# Patient Record
Sex: MALE | Race: BLACK OR AFRICAN AMERICAN | NOT HISPANIC OR LATINO | URBAN - METROPOLITAN AREA
[De-identification: names, ages, dates, MRNs, and addresses within clinical notes are randomized per-mention and may not be internally consistent; named-entity substitution may affect disease eponyms.]

---

## 2017-09-05 ENCOUNTER — OUTPATIENT (OUTPATIENT)
Dept: OUTPATIENT SERVICES | Facility: HOSPITAL | Age: 47
LOS: 1 days | End: 2017-09-05
Payer: MEDICARE

## 2017-09-05 PROCEDURE — 71020: CPT | Mod: 26

## 2017-09-08 ENCOUNTER — OUTPATIENT (OUTPATIENT)
Dept: OUTPATIENT SERVICES | Facility: HOSPITAL | Age: 47
LOS: 1 days | End: 2017-09-08

## 2018-01-16 ENCOUNTER — APPOINTMENT (OUTPATIENT)
Dept: CARDIOLOGY | Facility: CLINIC | Age: 48
End: 2018-01-16

## 2019-01-11 ENCOUNTER — RECORD ABSTRACTING (OUTPATIENT)
Age: 49
End: 2019-01-11

## 2019-01-14 ENCOUNTER — APPOINTMENT (OUTPATIENT)
Dept: CARDIOLOGY | Facility: CLINIC | Age: 49
End: 2019-01-14
Payer: MEDICARE

## 2019-01-14 ENCOUNTER — RECORD ABSTRACTING (OUTPATIENT)
Age: 49
End: 2019-01-14

## 2019-01-14 VITALS
DIASTOLIC BLOOD PRESSURE: 60 MMHG | HEIGHT: 70 IN | BODY MASS INDEX: 41.23 KG/M2 | WEIGHT: 288 LBS | OXYGEN SATURATION: 98 % | HEART RATE: 82 BPM | SYSTOLIC BLOOD PRESSURE: 110 MMHG

## 2019-01-14 DIAGNOSIS — Z86.19 PERSONAL HISTORY OF OTHER INFECTIOUS AND PARASITIC DISEASES: ICD-10-CM

## 2019-01-14 DIAGNOSIS — Z82.49 FAMILY HISTORY OF ISCHEMIC HEART DISEASE AND OTHER DISEASES OF THE CIRCULATORY SYSTEM: ICD-10-CM

## 2019-01-14 DIAGNOSIS — Z86.79 PERSONAL HISTORY OF OTHER DISEASES OF THE CIRCULATORY SYSTEM: ICD-10-CM

## 2019-01-14 DIAGNOSIS — Z87.891 PERSONAL HISTORY OF NICOTINE DEPENDENCE: ICD-10-CM

## 2019-01-14 PROCEDURE — 93282 PRGRMG EVAL IMPLANTABLE DFB: CPT

## 2019-01-14 PROCEDURE — 99204 OFFICE O/P NEW MOD 45 MIN: CPT

## 2019-01-14 RX ORDER — CHROMIUM 200 MCG
1000 TABLET ORAL DAILY
Refills: 0 | Status: ACTIVE | COMMUNITY

## 2019-01-14 RX ORDER — CHLORHEXIDINE GLUCONATE 4 %
250 LIQUID (ML) TOPICAL DAILY
Refills: 0 | Status: ACTIVE | COMMUNITY

## 2019-01-14 NOTE — ASSESSMENT
[FreeTextEntry1] : past tests for reference:\par \par Coronary angiography. 2004. Normal coronary arteries\par \par Echocardiogram 3/17/2015. LV ejection fraction less than 30%. LVIDD 6.5 left atrial size 4.5 cm, trace mitral tricuspid and aortic regurgitation noted. Overall no significant change compared to before.\par \par  EKG ordered and interpreted by me on 1/12/2016 indication is right failure cardiomyopathy hypertension" by me.  Normal sinus rhythm.  Poor RV progression.  Nonspecific ST-T wave changes.\par Labs 1/6/2016 creatinine is 0.81.  Potassium 4.6 sodium 140 CBC is stable. \par \par Echocardiogram reviewed on 4/14/2016.\par Echocardiogram was done on 4/8/2016.  LV ejection fraction 30%.  LVIDD 7.3 cm.  Left atrial size 4.9 cm.  Four-chamber dilatation.  Mild mitral and tricuspid regurgitation.  PAS P 27 mmHg.  Overall no significant change.  LVIDD has been fluctuating in different echocardiograms.  The remaining between 6.6-7.5 cm.\par \par Echocardiogram September 2017, ejection fraction 25%.\par AICD interrogation in October 2018 close to the ER. I\par EKG July 2018 normal sinus rhythm inferior Q waves LVH, nonspecific T wave changes.\par No recent labs available.

## 2019-01-14 NOTE — PHYSICAL EXAM
[General Appearance - Well Developed] : well developed [Normal Appearance] : normal appearance [Well Groomed] : well groomed [General Appearance - Well Nourished] : well nourished [No Deformities] : no deformities [General Appearance - In No Acute Distress] : no acute distress [Normal Conjunctiva] : the conjunctiva exhibited no abnormalities [No Oral Pallor] : no oral pallor [Normal Jugular Venous A Waves Present] : normal jugular venous A waves present [Normal Jugular Venous V Waves Present] : normal jugular venous V waves present [No Jugular Venous Dockery A Waves] : no jugular venous dockery A waves [Not Palpable] : not palpable [No Precordial Heave] : no precordial heave was noted [Normal Rate] : normal [Normal S1] : normal S1 [Normal S2] : normal S2 [S4] : an S4 was heard [I] : a grade 1 [2+] : left 2+ [No Abnormalities] : the abdominal aorta was not enlarged and no bruit was heard [No Pitting Edema] : no pitting edema present [Respiration, Rhythm And Depth] : normal respiratory rhythm and effort [Exaggerated Use Of Accessory Muscles For Inspiration] : no accessory muscle use [Auscultation Breath Sounds / Voice Sounds] : lungs were clear to auscultation bilaterally [Abdomen Soft] : soft [Abdomen Tenderness] : non-tender [Abdomen Mass (___ Cm)] : no abdominal mass palpated [Abnormal Walk] : normal gait [Nail Clubbing] : no clubbing of the fingernails [Cyanosis, Localized] : no localized cyanosis [Petechial Hemorrhages (___cm)] : no petechial hemorrhages [Skin Color & Pigmentation] : normal skin color and pigmentation [] : no rash [No Venous Stasis] : no venous stasis [Skin Lesions] : no skin lesions [No Skin Ulcers] : no skin ulcer [No Xanthoma] : no  xanthoma was observed [Oriented To Time, Place, And Person] : oriented to person, place, and time [Affect] : the affect was normal [Mood] : the mood was normal [No Anxiety] : not feeling anxious [FreeTextEntry1] : obesity [S3] : no S3 [Right Carotid Bruit] : no bruit heard over the right carotid [Left Carotid Bruit] : no bruit heard over the left carotid [Right Femoral Bruit] : no bruit heard over the right femoral artery [Left Femoral Bruit] : no bruit heard over the left femoral artery

## 2019-01-14 NOTE — HISTORY OF PRESENT ILLNESS
[FreeTextEntry1] : \par •History of dilated cardiomyopathy, class II functional status, LV end diastolic dimension 6.6. MUGA test earlier in 2014, 42%; ejection fraction by echocardiogram less than 35%, single lead AICD, last MVO2 16 mL/kg/min. It was done by Dr. Lopez. overall dimensions 6.6-7.5 cm.\par Last echocardiogram, September 2017 LV ejection fraction 25%.\par \par Single lead 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

## 2019-01-14 NOTE — REASON FOR VISIT
[Consultation] : a consultation regarding [Cardiomyopathy] : cardiomyopathy [Heart Failure] : congestive heart failure [Hyperlipidemia] : hyperlipidemia [Hypertension] : hypertension [Ventricular Tachycardia] : ventricular tachycardia [FreeTextEntry1] : 48-year-old is seen in consultation again in the office as he is changing his cardiologist back to a sense of a cardiology/not well cardiology.\par He has no chest pain. No PND, orthopnea, or pedal edema.\par He has no palpitation, dizziness, or syncopal episodes.\par He has not had any AICD discharge.\par He does not do regular exercise. His dietary restrictions are not well. He is gaining weight.\par He has exertional dyspnea. At around 100 yards distance. No associated symptoms. Mild severity. Intermittent in nature. Modified with exertion. Relief at rest.\par He has not had any recent hospital admission from cardiac point of view.\par \par I have reviewed available information from his prior cardiologist.

## 2019-01-14 NOTE — DISCUSSION/SUMMARY
[FreeTextEntry1] : #1 nonischemic dilated cardiomyopathy. His ejection fraction loss check 25% in September 2017 class 2-3, heart failure symptoms without signs of volume overload.\par Obesity noted.\par No regular exercise noted.\par Dietary restrictions are stable.\par No recent AICD discharge.\par \par I reviewed the pathophysiology of dilated cardiomyopathy, again with him.\par \par Counseling regarding low saturated fat, salt and carbohydrate intake was reviewed. Active lifestyle and regular. Exercise along with weight management is advised.\par Weight reduction is reviewed. I have discussed at length. Exercise and activity level to be increased. Salt restriction, along with other doctors restrictions.\par Compliance with medication reviewed.\par high risk symptoms to notify us were discussed.\par \par CBC, CMP, lipid panel ordered.\par Echocardiogram ordered for an ejection fraction dimension\par #2 AICD. A single lead [facility]. we will interoggate it.  He achieved. December 12, 2018. Plan pulse generator change.\par #3 hyperlipidemia. Fasting lipid panel ordered. Continue statin therapy.\par #4 morbid obesity. Weight reduction is strongly recommended.\par #5 sleep apnea. Obstructive. Continue use of CPAP on a regular basis. 6 ventricular and supraventricular weakness. Asymptomatic. Continue present management of cardiomyopathy.\par #6 history of mitral insufficiency. Follow up with echocardiogram in presence of dilated cardiomyopathy.\par \par All the above were at length reviewed. Answered all the questions. Thank you very much for this kind referral. Please do not hesitate to give me a call for any question.\par Part of this transcription was done with voice recognition software and phonetically similar errors are common. I apologize for that. Please donot hesitate to call for any questions due to above.\par \par Followup after post generator change.\par

## 2019-01-14 NOTE — REASON FOR VISIT
[New Patient Device Check] : new patient device check visit [HEATHER (Elective Replacement Indication)] : elective replacement indication

## 2019-01-14 NOTE — REVIEW OF SYSTEMS
[Shortness Of Breath] : shortness of breath [Dyspnea on exertion] : dyspnea during exertion [Chest  Pressure] : no chest pressure [Chest Pain] : no chest pain [Lower Ext Edema] : no extremity edema [Leg Claudication] : no intermittent leg claudication [Palpitations] : no palpitations [Negative] : Psychiatric

## 2019-01-14 NOTE — PROCEDURE
[No] : not [See Device Printout] : See device printout [ICD] : Implantable cardioverter-defibrillator [VVI] : VVI [Lead Imp:  ___ohms] : lead impedance was [unfilled] ohms [Sensing Amplitude ___mv] : sensing amplitude was [unfilled] mv [___V @] : [unfilled] V [___ ms] : [unfilled] ms [None] : none [de-identified] : [facility]  [de-identified] : current [facility] VR [id number] [de-identified] : [id number] [de-identified] : 8/10/10 [de-identified] : 38 [de-identified] : HEATHER 12/12/18 [de-identified] : Alert was noted patient device at [facility]ATHER on December 12, 2018. No significant arrhythmia noted. No device therapy.

## 2019-01-14 NOTE — REVIEW OF SYSTEMS
[see HPI] : see HPI [Shortness Of Breath] : shortness of breath [Dyspnea on exertion] : dyspnea during exertion [Negative] : Heme/Lymph [Chest  Pressure] : no chest pressure [Chest Pain] : no chest pain [Lower Ext Edema] : no extremity edema [Leg Claudication] : no intermittent leg claudication [Palpitations] : no palpitations

## 2019-01-21 ENCOUNTER — APPOINTMENT (OUTPATIENT)
Dept: CARDIOLOGY | Facility: CLINIC | Age: 49
End: 2019-01-21
Payer: MEDICARE

## 2019-01-21 PROCEDURE — 93306 TTE W/DOPPLER COMPLETE: CPT

## 2019-01-24 ENCOUNTER — APPOINTMENT (OUTPATIENT)
Age: 49
End: 2019-01-24
Payer: MEDICARE

## 2019-01-24 VITALS
SYSTOLIC BLOOD PRESSURE: 110 MMHG | DIASTOLIC BLOOD PRESSURE: 64 MMHG | WEIGHT: 285 LBS | HEIGHT: 70 IN | OXYGEN SATURATION: 97 % | BODY MASS INDEX: 40.8 KG/M2 | HEART RATE: 81 BPM

## 2019-01-24 PROCEDURE — 99214 OFFICE O/P EST MOD 30 MIN: CPT

## 2019-01-24 RX ORDER — ASPIRIN 81 MG
81 TABLET, DELAYED RELEASE (ENTERIC COATED) ORAL DAILY
Refills: 0 | Status: ACTIVE | COMMUNITY

## 2019-01-24 NOTE — PHYSICAL EXAM
[General Appearance - Well Developed] : well developed [Normal Appearance] : normal appearance [Well Groomed] : well groomed [General Appearance - Well Nourished] : well nourished [No Deformities] : no deformities [General Appearance - In No Acute Distress] : no acute distress [Normal Conjunctiva] : the conjunctiva exhibited no abnormalities [No Oral Pallor] : no oral pallor [Normal Jugular Venous A Waves Present] : normal jugular venous A waves present [Normal Jugular Venous V Waves Present] : normal jugular venous V waves present [No Jugular Venous Dockery A Waves] : no jugular venous dockery A waves [Respiration, Rhythm And Depth] : normal respiratory rhythm and effort [Exaggerated Use Of Accessory Muscles For Inspiration] : no accessory muscle use [Auscultation Breath Sounds / Voice Sounds] : lungs were clear to auscultation bilaterally [Abdomen Soft] : soft [Abdomen Tenderness] : non-tender [Abdomen Mass (___ Cm)] : no abdominal mass palpated [Abnormal Walk] : normal gait [Nail Clubbing] : no clubbing of the fingernails [Cyanosis, Localized] : no localized cyanosis [Petechial Hemorrhages (___cm)] : no petechial hemorrhages [Skin Color & Pigmentation] : normal skin color and pigmentation [] : no rash [No Venous Stasis] : no venous stasis [Skin Lesions] : no skin lesions [No Skin Ulcers] : no skin ulcer [No Xanthoma] : no  xanthoma was observed [Oriented To Time, Place, And Person] : oriented to person, place, and time [Affect] : the affect was normal [Mood] : the mood was normal [No Anxiety] : not feeling anxious [Not Palpable] : not palpable [No Precordial Heave] : no precordial heave was noted [Normal Rate] : normal [Normal S1] : normal S1 [Normal S2] : normal S2 [S4] : an S4 was heard [I] : a grade 1 [2+] : left 2+ [No Abnormalities] : the abdominal aorta was not enlarged and no bruit was heard [No Pitting Edema] : no pitting edema present [FreeTextEntry1] : obesity [S3] : no S3 [Right Carotid Bruit] : no bruit heard over the right carotid [Left Carotid Bruit] : no bruit heard over the left carotid [Right Femoral Bruit] : no bruit heard over the right femoral artery [Left Femoral Bruit] : no bruit heard over the left femoral artery

## 2019-01-24 NOTE — DISCUSSION/SUMMARY
[FreeTextEntry1] : Jonel is a 49-year-old male with medical history detailed above and active medical issues including:\par \par - ICD at [facility] 12/18/19 scheduled for ICD generator change Dr Valentino 2/11/19, [facility] [place]par Patient will be seen for a one-week postop wound check.  Patient will return to the cardiology care of Dr Sita Macias. Patient is optimized from a cardiovascular perspective and may proceed with surgery.  Patient will continue current medications including as to the time of surgery. Please call with any further questions. \par \par - Dilated cardiomyopathy LVEF 30-35% echo January 2019\par \par - HFrEF, well compensated volume status is optimized\par \par - History of NSVT, PSVT, sinus tachycardia, heart rate remains well controlled on Coreg\par \par - Obstructive sleep apnea\par \par - Morbid obesity\par \par - Dyslipidemia\par \par Advised patient to follow active lifestyle with regular cardiovascular exercise. Patient educated on lifestyle and diet modification with low sodium low fat diet and avoidance of excessive alcohol. Patient is aware to call with any symptoms or concerns. \par \par Jonel will followup with Dr Tavon Orona for primary care\par

## 2019-01-24 NOTE — PROCEDURE
[No] : not [See Device Printout] : See device printout [ICD] : Implantable cardioverter-defibrillator [VVI] : VVI [Lead Imp:  ___ohms] : lead impedance was [unfilled] ohms [Sensing Amplitude ___mv] : sensing amplitude was [unfilled] mv [___V @] : [unfilled] V [___ ms] : [unfilled] ms [None] : none [de-identified] : [facility]  [de-identified] : current [facility] VR [id number] [de-identified] : [id number] [de-identified] : 8/10/10 [de-identified] : 38 [de-identified] : HEATHER 12/12/18 [de-identified] : Alert was noted patient device at [facility]ER on December 12, 2018. No significant arrhythmia noted. No device therapy.

## 2019-01-28 ENCOUNTER — OUTPATIENT (OUTPATIENT)
Dept: OUTPATIENT SERVICES | Facility: HOSPITAL | Age: 49
LOS: 1 days | End: 2019-01-28
Payer: MEDICARE

## 2019-01-28 PROCEDURE — 71046 X-RAY EXAM CHEST 2 VIEWS: CPT | Mod: 26

## 2019-02-07 ENCOUNTER — RX RENEWAL (OUTPATIENT)
Age: 49
End: 2019-02-07

## 2019-02-08 ENCOUNTER — RX RENEWAL (OUTPATIENT)
Age: 49
End: 2019-02-08

## 2019-02-11 ENCOUNTER — OUTPATIENT (OUTPATIENT)
Dept: OUTPATIENT SERVICES | Facility: HOSPITAL | Age: 49
LOS: 1 days | End: 2019-02-11
Payer: MEDICARE

## 2019-02-11 PROCEDURE — 33262 RMVL& REPLC PULSE GEN 1 LEAD: CPT

## 2019-02-19 ENCOUNTER — APPOINTMENT (OUTPATIENT)
Dept: CARDIOLOGY | Facility: CLINIC | Age: 49
End: 2019-02-19
Payer: MEDICARE

## 2019-02-19 VITALS
SYSTOLIC BLOOD PRESSURE: 110 MMHG | WEIGHT: 292 LBS | DIASTOLIC BLOOD PRESSURE: 64 MMHG | HEIGHT: 70 IN | HEART RATE: 82 BPM | BODY MASS INDEX: 41.8 KG/M2

## 2019-02-19 PROCEDURE — 99024 POSTOP FOLLOW-UP VISIT: CPT

## 2019-02-19 PROCEDURE — 93282 PRGRMG EVAL IMPLANTABLE DFB: CPT

## 2019-02-19 RX ORDER — CEFUROXIME AXETIL 500 MG/1
500 TABLET ORAL
Qty: 10 | Refills: 0 | Status: DISCONTINUED | COMMUNITY
Start: 2019-02-07 | End: 2019-02-19

## 2019-02-19 NOTE — PHYSICAL EXAM
[General Appearance - Well Developed] : well developed [Normal Appearance] : normal appearance [Well Groomed] : well groomed [General Appearance - Well Nourished] : well nourished [No Deformities] : no deformities [General Appearance - In No Acute Distress] : no acute distress [Normal Conjunctiva] : the conjunctiva exhibited no abnormalities [No Oral Pallor] : no oral pallor [Normal Jugular Venous A Waves Present] : normal jugular venous A waves present [Normal Jugular Venous V Waves Present] : normal jugular venous V waves present [No Jugular Venous Dockery A Waves] : no jugular venous dockery A waves [Respiration, Rhythm And Depth] : normal respiratory rhythm and effort [Exaggerated Use Of Accessory Muscles For Inspiration] : no accessory muscle use [Auscultation Breath Sounds / Voice Sounds] : lungs were clear to auscultation bilaterally [Abdomen Soft] : soft [Abdomen Tenderness] : non-tender [Abdomen Mass (___ Cm)] : no abdominal mass palpated [Abnormal Walk] : normal gait [Nail Clubbing] : no clubbing of the fingernails [Cyanosis, Localized] : no localized cyanosis [Petechial Hemorrhages (___cm)] : no petechial hemorrhages [Skin Color & Pigmentation] : normal skin color and pigmentation [] : no rash [No Venous Stasis] : no venous stasis [Skin Lesions] : no skin lesions [No Skin Ulcers] : no skin ulcer [No Xanthoma] : no  xanthoma was observed [Oriented To Time, Place, And Person] : oriented to person, place, and time [Affect] : the affect was normal [Mood] : the mood was normal [No Anxiety] : not feeling anxious [Not Palpable] : not palpable [No Precordial Heave] : no precordial heave was noted [Normal Rate] : normal [Normal S1] : normal S1 [Normal S2] : normal S2 [S4] : an S4 was heard [I] : a grade 1 [2+] : left 2+ [No Abnormalities] : the abdominal aorta was not enlarged and no bruit was heard [No Pitting Edema] : no pitting edema present [Heart Rate And Rhythm] : heart rate and rhythm were normal [Heart Sounds] : normal S1 and S2 [Murmurs] : no murmurs present [FreeTextEntry1] : ICD wound is clean and dry with granulation seen [S3] : no S3 [Right Carotid Bruit] : no bruit heard over the right carotid [Left Carotid Bruit] : no bruit heard over the left carotid [Right Femoral Bruit] : no bruit heard over the right femoral artery [Left Femoral Bruit] : no bruit heard over the left femoral artery

## 2019-02-19 NOTE — PROCEDURE
[No] : not [See Device Printout] : See device printout [ICD] : Implantable cardioverter-defibrillator [VVI] : VVI [Lead Imp:  ___ohms] : lead impedance was [unfilled] ohms [Sensing Amplitude ___mv] : sensing amplitude was [unfilled] mv [___V @] : [unfilled] V [___ ms] : [unfilled] ms [None] : none [de-identified] : [facility]  [de-identified] : current [facility] VR [id number] [de-identified] : [id number] [de-identified] : 8/10/10 [de-identified] : 38 [de-identified] : HEATHER 12/12/18 [de-identified] : Alert was noted patient device at [facility]ER on December 12, 2018. No significant arrhythmia noted. No device therapy.

## 2019-02-19 NOTE — REASON FOR VISIT
[Follow-Up - Clinic] : a clinic follow-up of [New Patient Device Check] : new patient device check visit [HEATHER (Elective Replacement Indication)] : elective replacement indication [FreeTextEntry2] : wound check postop single chamber MDT ICD generator change 2/11/19 [facility], CMP LVEF 30-35% echo Jan 2019 [FreeTextEntry1] : ICD incision is clean and dry, wound edges are well approximated with granulation seen.  It appears patient has early signs of keloid formation.  Bactoban ointment will be applied sparingly twice per day without dressing.  Patient will continue to shower daily then keep wound clean and dry.  No bath or swimming until second wound check in one week. \par \par Device check today reveals normally functioning device with unchanged settings. \par \par Jonel is a 48-year-old male with history of dilated cardiomyopathy LVEF 30-35% echo in January 2019, NSVT, PSVT and sinus tach, obstructive sleep apnea, morbid obesity, dyslipidemia,HFrEF, [facility] single-chamber ICD 2010, V-pacing less than 1%.  ICD at [facility]ER 12/18/19 scheduled for ICD generator change Dr Valentino 2/11/19, [facility] [place].\par \par Patient has dyspnea with moderate exertion. Cardiovascular review of symptoms is negative for exertional chest pain,  palpitations, dizziness or syncope.  No PND or orthopnea leg edema.  No bleeding or black stool.  No ICD shocks. \par \par Patient is walking 15 minutes without exertional chest pain. \par \par \par \par \par

## 2019-02-19 NOTE — PROCEDURE
[No] : not [NSR] : normal sinus rhythm [ICD] : Implantable cardioverter-defibrillator [VVI] : VVI [Voltage: ___ volts] : Voltage was [unfilled] volts [Threshold Testing Performed] : Threshold testing was performed [Lead Imp:  ___ohms] : lead impedance was [unfilled] ohms [Sensing Amplitude ___mv] : sensing amplitude was [unfilled] mv [___V @] : [unfilled] V [___ ms] : [unfilled] ms [None] : none [de-identified] : [employer] [de-identified] : Yvon JOSE VR [id number] [de-identified] : [id number] [de-identified] : 2/11/19 [de-identified] : 38 [de-identified] : 10.9 years [de-identified] : Apaced 0%\par Vpaced <0.1%\par \par Shock Therapy\par VF at 200bpm: ATP during charge, 35J x6\par FVT via VF at 231bpm: burst (1), 35J x5\par VT: Off\par \par No new recorded events.\par \par Next interrogation in 3 months. \par \par Sincerely,\par \par CALDERON Johnson\par Reviewed with supervising MD: Dr. Patrick Valentino

## 2019-02-19 NOTE — DISCUSSION/SUMMARY
[FreeTextEntry1] : Jonel is a 49-year-old male with medical history detailed above and active medical issues including:\par \par - Wound check for ICD generator [place] [facility] [place] incision is clean and dry, wound edges are well approximated with granulation seen.  It appears patient has early signs of keloid formation.  Bactoban ointment will be applied sparingly twice per day without dressing.  Patient will continue to shower daily then keep wound clean and dry.  No bath or swimming until second wound check in one week. \par \par - Dilated cardiomyopathy LVEF 30-35% echo January 2019\par \par - HFrEF, well compensated volume status is optimized\par \par - History of NSVT, PSVT, sinus tachycardia, heart rate remains well controlled on Coreg\par \par - Obstructive sleep apnea\par \par - Morbid obesity\par \par - Dyslipidemia\par \par Advised patient to follow active lifestyle with regular cardiovascular exercise. Patient educated on lifestyle and diet modification with low sodium low fat diet and avoidance of excessive alcohol. Patient is aware to call with any symptoms or concerns. \par \par Patient will followup with cardiologist Dr Sita Macias. \par \par Jonel will followup with Dr Tavon Orona for primary care\par

## 2019-02-22 ENCOUNTER — RECORD ABSTRACTING (OUTPATIENT)
Age: 49
End: 2019-02-22

## 2019-02-27 ENCOUNTER — APPOINTMENT (OUTPATIENT)
Dept: CARDIOLOGY | Facility: CLINIC | Age: 49
End: 2019-02-27
Payer: MEDICARE

## 2019-02-27 VITALS
BODY MASS INDEX: 41.52 KG/M2 | HEIGHT: 70 IN | WEIGHT: 290 LBS | OXYGEN SATURATION: 99 % | HEART RATE: 90 BPM | DIASTOLIC BLOOD PRESSURE: 64 MMHG | SYSTOLIC BLOOD PRESSURE: 118 MMHG

## 2019-02-27 PROCEDURE — 99024 POSTOP FOLLOW-UP VISIT: CPT

## 2019-02-27 NOTE — DISCUSSION/SUMMARY
[FreeTextEntry1] : Jonel is a 49-year-old male with medical history detailed above and active medical issues including:\par \par - Second wound check for ICD generator [place]19[place] [facility] [place] incision is clean and dry, wound edges are well approximated with granulation seen.  It appears patient has early signs of keloid formation.  Bactoban ointment will be applied sparingly twice per day with Tegoderm dressing daytime.  Patient will continue to shower daily then keep wound clean and dry.  No bath or swimming until 3rd wound check in one week. If wound has poor healing will refer patient to plastic surgery Dr Slaughter.\par \par - Dilated cardiomyopathy LVEF 30-35% echo January 2019\par \par - HFrEF, well compensated volume status is optimized\par \par - History of NSVT, PSVT, sinus tachycardia, heart rate remains well controlled on Coreg\par \par - Obstructive sleep apnea\par \par - Morbid obesity\par \par - Dyslipidemia\par \par Advised patient to follow active lifestyle with regular cardiovascular exercise. Patient educated on lifestyle and diet modification with low sodium low fat diet and avoidance of excessive alcohol. Patient is aware to call with any symptoms or concerns. \par \par Patient will followup with cardiologist Dr Sita Macias. \par \par Jonel will followup with Dr Tavon Orona for primary care\par

## 2019-02-27 NOTE — REASON FOR VISIT
[Follow-Up - Clinic] : a clinic follow-up of [New Patient Device Check] : new patient device check visit [HEATHER (Elective Replacement Indication)] : elective replacement indication [FreeTextEntry2] : second wound check postop single chamber MDT ICD generator change 2/11/19 [facility], CMP LVEF 30-35% echo Jan 2019 [FreeTextEntry1] : ICD incision is clean and dry, wound edges are well approximated with granulation seen.  It appears patient has early signs of keloid formation.  Bactoban ointment will be applied sparingly twice per day with Tegoderm dressing daytime one additional week.  Patient will continue to shower daily then keep wound clean and dry.  No bath or swimming until second wound check in one week. \par \par Device check 2/19/19 reveals normally functioning device with unchanged settings. \par \par Jonel is a 48-year-old male with history of dilated cardiomyopathy LVEF 30-35% echo in January 2019, NSVT, PSVT and sinus tach, obstructive sleep apnea, morbid obesity, dyslipidemia,HFrEF, [facility] single-chamber ICD 2010, V-pacing less than 1%.\par \par Patient has dyspnea with moderate exertion. Cardiovascular review of symptoms is negative for exertional chest pain,  palpitations, dizziness or syncope.  No PND or orthopnea leg edema.  No bleeding or black stool.  No ICD shocks. \par \par Patient is walking 15 minutes without exertional chest pain. \par \par \par \par \par

## 2019-02-27 NOTE — PROCEDURE
[No] : not [See Device Printout] : See device printout [ICD] : Implantable cardioverter-defibrillator [VVI] : VVI [Lead Imp:  ___ohms] : lead impedance was [unfilled] ohms [Sensing Amplitude ___mv] : sensing amplitude was [unfilled] mv [___V @] : [unfilled] V [___ ms] : [unfilled] ms [None] : none [de-identified] : [facility]  [de-identified] : current [facility] VR [id number] [de-identified] : [id number] [de-identified] : 8/10/10 [de-identified] : 38 [de-identified] : HEATHER 12/12/18 [de-identified] : Alert was noted patient device at HEATHER on December 12, 2018. No significant arrhythmia noted. No device therapy.

## 2019-02-27 NOTE — PHYSICAL EXAM
[General Appearance - Well Developed] : well developed [Normal Appearance] : normal appearance [Well Groomed] : well groomed [General Appearance - Well Nourished] : well nourished [No Deformities] : no deformities [General Appearance - In No Acute Distress] : no acute distress [Normal Conjunctiva] : the conjunctiva exhibited no abnormalities [No Oral Pallor] : no oral pallor [Normal Jugular Venous A Waves Present] : normal jugular venous A waves present [Normal Jugular Venous V Waves Present] : normal jugular venous V waves present [No Jugular Venous Dockery A Waves] : no jugular venous dockery A waves [Respiration, Rhythm And Depth] : normal respiratory rhythm and effort [Exaggerated Use Of Accessory Muscles For Inspiration] : no accessory muscle use [Auscultation Breath Sounds / Voice Sounds] : lungs were clear to auscultation bilaterally [Heart Rate And Rhythm] : heart rate and rhythm were normal [Heart Sounds] : normal S1 and S2 [Murmurs] : no murmurs present [Abdomen Soft] : soft [Abdomen Tenderness] : non-tender [Abdomen Mass (___ Cm)] : no abdominal mass palpated [Abnormal Walk] : normal gait [Nail Clubbing] : no clubbing of the fingernails [Cyanosis, Localized] : no localized cyanosis [Petechial Hemorrhages (___cm)] : no petechial hemorrhages [Skin Color & Pigmentation] : normal skin color and pigmentation [] : no rash [No Venous Stasis] : no venous stasis [Skin Lesions] : no skin lesions [No Skin Ulcers] : no skin ulcer [No Xanthoma] : no  xanthoma was observed [Oriented To Time, Place, And Person] : oriented to person, place, and time [Affect] : the affect was normal [Mood] : the mood was normal [No Anxiety] : not feeling anxious [Not Palpable] : not palpable [No Precordial Heave] : no precordial heave was noted [Normal Rate] : normal [Normal S1] : normal S1 [Normal S2] : normal S2 [S4] : an S4 was heard [I] : a grade 1 [2+] : left 2+ [No Abnormalities] : the abdominal aorta was not enlarged and no bruit was heard [No Pitting Edema] : no pitting edema present [FreeTextEntry1] : ICD wound is clean and dry with granulation seen [S3] : no S3 [Right Carotid Bruit] : no bruit heard over the right carotid [Left Carotid Bruit] : no bruit heard over the left carotid [Right Femoral Bruit] : no bruit heard over the right femoral artery [Left Femoral Bruit] : no bruit heard over the left femoral artery

## 2019-03-05 ENCOUNTER — APPOINTMENT (OUTPATIENT)
Dept: CARDIOLOGY | Facility: CLINIC | Age: 49
End: 2019-03-05

## 2019-03-07 ENCOUNTER — TRANSCRIPTION ENCOUNTER (OUTPATIENT)
Age: 49
End: 2019-03-07

## 2019-03-07 ENCOUNTER — APPOINTMENT (OUTPATIENT)
Dept: CARDIOLOGY | Facility: CLINIC | Age: 49
End: 2019-03-07
Payer: MEDICARE

## 2019-03-07 VITALS
BODY MASS INDEX: 41.23 KG/M2 | HEIGHT: 70 IN | SYSTOLIC BLOOD PRESSURE: 100 MMHG | DIASTOLIC BLOOD PRESSURE: 60 MMHG | WEIGHT: 288 LBS | HEART RATE: 68 BPM | OXYGEN SATURATION: 97 %

## 2019-03-07 PROCEDURE — 99024 POSTOP FOLLOW-UP VISIT: CPT

## 2019-03-07 NOTE — DISCUSSION/SUMMARY
[FreeTextEntry1] : Jonel is a 49-year-old male with medical history detailed above and active medical issues including:\par \par - ICD incision is clean and dry, wound edges are well approximated with granulation seen.  It appears patient has early signs of keloid formation.  Bactoban ointment will be applied sparingly twice per day with Tegoderm dressing daytime one additional week.  Patient will continue to shower daily then keep wound clean and dry.  No bath or swimming until second wound check in one week. Patient will followup with plastic surgery Dr Jean Slaughter for continued wound care management. \par \par - Dilated cardiomyopathy LVEF 30-35% echo January 2019\par \par - HFrEF, well compensated volume status is optimized\par \par - History of NSVT, PSVT, sinus tachycardia, heart rate remains well controlled on Coreg\par \par - Obstructive sleep apnea\par \par - Morbid obesity\par \par - Dyslipidemia\par \par Advised patient to follow active lifestyle with regular cardiovascular exercise. Patient educated on lifestyle and diet modification with low sodium low fat diet and avoidance of excessive alcohol. Patient is aware to call with any symptoms or concerns. \par \par Patient will followup with cardiologist Dr Sita Macias. \par \par Jonel will followup with Dr Tavon Orona for primary care\par

## 2019-03-07 NOTE — PROCEDURE
[No] : not [See Device Printout] : See device printout [ICD] : Implantable cardioverter-defibrillator [VVI] : VVI [Lead Imp:  ___ohms] : lead impedance was [unfilled] ohms [Sensing Amplitude ___mv] : sensing amplitude was [unfilled] mv [___V @] : [unfilled] V [___ ms] : [unfilled] ms [None] : none [de-identified] : [facility]  [de-identified] : current [facility] VR [id number] [de-identified] : [id number] [de-identified] : 8/10/10 [de-identified] : 38 [de-identified] : HEATHER 12/12/18 [de-identified] : Alert was noted patient device at HEATHER on December 12, 2018. No significant arrhythmia noted. No device therapy.

## 2019-03-07 NOTE — REASON FOR VISIT
[Follow-Up - Clinic] : a clinic follow-up of [New Patient Device Check] : new patient device check visit [HEATHER (Elective Replacement Indication)] : elective replacement indication [FreeTextEntry2] : third wound check postop single chamber MDT ICD generator change 2/11/19 [facility], CMP LVEF 30-35% echo Jan 2019 [FreeTextEntry1] : ICD incision is clean and dry, wound edges are well approximated with granulation seen.  It appears patient has early signs of keloid formation.  Bactoban ointment will be applied sparingly twice per day with Tegoderm dressing daytime one additional week.  Patient will continue to shower daily then keep wound clean and dry.  No bath or swimming until second wound check in one week. Patient will followup with plastic surgery Dr Jean Slaughter for continued wound care management. \par \par Device check 2/19/19 reveals normally functioning device with unchanged settings. \par \par Jonel is a 48-year-old male with history of dilated cardiomyopathy LVEF 30-35% echo in January 2019, NSVT, PSVT and sinus tach, obstructive sleep apnea, morbid obesity, dyslipidemia,HFrEF, [facility] single-chamber ICD 2010, V-pacing less than 1%.\par \par Patient has dyspnea with moderate exertion. Cardiovascular review of symptoms is negative for exertional chest pain,  palpitations, dizziness or syncope.  No PND or orthopnea leg edema.  No bleeding or black stool.  No ICD shocks. \par \par Patient is walking 15 minutes without exertional chest pain. \par \par \par \par \par

## 2019-04-15 ENCOUNTER — APPOINTMENT (OUTPATIENT)
Dept: CARDIOLOGY | Facility: CLINIC | Age: 49
End: 2019-04-15
Payer: MEDICARE

## 2019-04-15 ENCOUNTER — RECORD ABSTRACTING (OUTPATIENT)
Age: 49
End: 2019-04-15

## 2019-04-15 VITALS
BODY MASS INDEX: 41.23 KG/M2 | WEIGHT: 288 LBS | SYSTOLIC BLOOD PRESSURE: 100 MMHG | HEIGHT: 70 IN | DIASTOLIC BLOOD PRESSURE: 60 MMHG | HEART RATE: 71 BPM | OXYGEN SATURATION: 98 %

## 2019-04-15 DIAGNOSIS — Z09 ENCOUNTER FOR FOLLOW-UP EXAMINATION AFTER COMPLETED TREATMENT FOR CONDITIONS OTHER THAN MALIGNANT NEOPLASM: ICD-10-CM

## 2019-04-15 DIAGNOSIS — Z51.89 ENCOUNTER FOR OTHER SPECIFIED AFTERCARE: ICD-10-CM

## 2019-04-15 DIAGNOSIS — Z29.9 ENCOUNTER FOR PROPHYLACTIC MEASURES, UNSPECIFIED: ICD-10-CM

## 2019-04-15 PROCEDURE — 99214 OFFICE O/P EST MOD 30 MIN: CPT | Mod: 25

## 2019-04-15 PROCEDURE — 93282 PRGRMG EVAL IMPLANTABLE DFB: CPT

## 2019-04-15 NOTE — REVIEW OF SYSTEMS
[Shortness Of Breath] : shortness of breath [Dyspnea on exertion] : dyspnea during exertion [see HPI] : see HPI [Negative] : Endocrine [Chest  Pressure] : no chest pressure [Chest Pain] : no chest pain [Lower Ext Edema] : no extremity edema [Palpitations] : no palpitations [Leg Claudication] : no intermittent leg claudication

## 2019-04-15 NOTE — ASSESSMENT
[FreeTextEntry1] : past tests for reference:\par \par Coronary angiography. 2004. Normal coronary arteries\par \par Echocardiogram 3/17/2015. LV ejection fraction less than 30%. LVIDD 6.5 left atrial size 4.5 cm, trace mitral tricuspid and aortic regurgitation noted. Overall no significant change compared to before.\par \par  EKG ordered and interpreted by me on 1/12/2016 indication is right failure cardiomyopathy hypertension" by me.  Normal sinus rhythm.  Poor RV progression.  Nonspecific ST-T wave changes.\par Labs 1/6/2016 creatinine is 0.81.  Potassium 4.6 sodium 140 CBC is stable. \par \par Echocardiogram reviewed on 4/14/2016.\par Echocardiogram was done on 4/8/2016.  LV ejection fraction 30%.  LVIDD 7.3 cm.  Left atrial size 4.9 cm.  Four-chamber dilatation.  Mild mitral and tricuspid regurgitation.  PAS P 27 mmHg.  Overall no significant change.  LVIDD has been fluctuating in different echocardiograms.  The remaining between 6.6-7.5 cm.\par \par Echocardiogram September 2017, ejection fraction 25%.\par AICD interrogation in October 2018 close to the ER. I\par EKG July 2018 normal sinus rhythm inferior Q waves LVH, nonspecific T wave changes.\par No recent labs available.\par \par Reviewed on April 15, 2019\par Echocardiogram January 2019 and a ejection fraction 30-35% . LVIDD 6.0. Mild mitral regurgitation. Moderate and judgment. Concentric LVH. Normal pulmonary pressures.\par Dr. Valentino information reviewed in addition to ICD revision

## 2019-04-15 NOTE — PHYSICAL EXAM
[Normal Appearance] : normal appearance [General Appearance - Well Developed] : well developed [No Deformities] : no deformities [General Appearance - Well Nourished] : well nourished [Well Groomed] : well groomed [Normal Conjunctiva] : the conjunctiva exhibited no abnormalities [General Appearance - In No Acute Distress] : no acute distress [No Oral Pallor] : no oral pallor [Normal Jugular Venous A Waves Present] : normal jugular venous A waves present [Normal Jugular Venous V Waves Present] : normal jugular venous V waves present [No Jugular Venous Dockery A Waves] : no jugular venous dockery A waves [Respiration, Rhythm And Depth] : normal respiratory rhythm and effort [Exaggerated Use Of Accessory Muscles For Inspiration] : no accessory muscle use [Auscultation Breath Sounds / Voice Sounds] : lungs were clear to auscultation bilaterally [Arterial Pulses Normal] : the arterial pulses were normal [Heart Sounds] : normal S1 and S2 [Heart Rate And Rhythm] : heart rate and rhythm were normal [Veins - Varicosity Changes] : no varicosital changes were noted in the lower extremities [Edema] : no peripheral edema present [Abdomen Soft] : soft [Abnormal Walk] : normal gait [Nail Clubbing] : no clubbing of the fingernails [Cyanosis, Localized] : no localized cyanosis [] : no rash [Skin Color & Pigmentation] : normal skin color and pigmentation [Oriented To Time, Place, And Person] : oriented to person, place, and time [Mood] : the mood was normal [No Anxiety] : not feeling anxious [Affect] : the affect was normal [FreeTextEntry1] : PMi could not be plapatedLUSB 1/6 at the base, gallop. no rub, heave or click.

## 2019-04-15 NOTE — DISCUSSION/SUMMARY
[FreeTextEntry1] : #1 nonischemic dilated cardiomyopathy. His ejection fraction last check 30-35% in September 2017 class 2, heart failure symptoms without signs of volume overload.\par Obesity noted.\par No regular exercise noted.\par Dietary restrictions are stable.\par No recent AICD discharge.\par \par I reviewed the pathophysiology of dilated cardiomyopathy, again with him.\par \par Counseling regarding low saturated fat, salt and carbohydrate intake was reviewed. Active lifestyle and regular. Exercise along with weight management is advised.\par Weight reduction is reviewed. I have discussed at length. Exercise and activity level to be increased. Salt restriction, along with other doctors restrictions.\par Compliance with medication reviewed.\par high risk symptoms to notify us were discussed.\par CBC, CMP, lipid panel ordered. he is to do it today\par #2 AICD. A single lead MDT. Interrogated today. Continue to follow\par #3 hyperlipidemia. Fasting lipid panel ordered. Continue statin therapy.\par #4 morbid obesity. Weight reduction is strongly recommended.\par #5 sleep apnea. Obstructive. Continue use of CPAP on a regular basis. 6 ventricular and supraventricular weakness. Asymptomatic. Continue present management of cardiomyopathy.\par #6 history of mitral insufficiency. Follow up with echocardiogram in presence of dilated cardiomyopathy.\par \par All the above were at length reviewed. Answered all the questions. Thank you very much for this kind referral. Please do not hesitate to give me a call for any question.\par Part of this transcription was done with voice recognition software and phonetically similar errors are common. I apologize for that. Please donot hesitate to call for any questions due to above.\par \par Followup  3 months with ICD check\par

## 2019-04-15 NOTE — PROCEDURE
[No] : not [NSR] : normal sinus rhythm [ICD] : Implantable cardioverter-defibrillator [VVI] : VVI [Voltage: ___ volts] : Voltage was [unfilled] volts [Threshold Testing Performed] : Threshold testing was performed [Lead Imp:  ___ohms] : lead impedance was [unfilled] ohms [Sensing Amplitude ___mv] : sensing amplitude was [unfilled] mv [___V @] : [unfilled] V [___ ms] : [unfilled] ms [None] : none [de-identified] : Yvon JOSE VR [id number] [de-identified] : [employer] [de-identified] : 40 [de-identified] : [id number] [de-identified] : 2/11/19 [de-identified] : 10.8 years [de-identified] : Apaced 0%\par Vpaced <0.1%\par \par Shock Therapy\par VF at 200bpm: ATP during charge, 35J x6\par FVT via VF at 231bpm: burst (1), 35J x5\par VT: Off\par \par No new recorded events.\par \par OV with Dr. Macias today.\par \par Next interrogation in 3 months. \par \par Sincerely,\par \par CALDERON Johnson\par Reviewed with supervising MD: Dr. Sita Macias

## 2019-04-15 NOTE — REASON FOR VISIT
[Follow-Up - Clinic] : a clinic follow-up of [Heart Failure] : congestive heart failure [Cardiomyopathy] : cardiomyopathy [Hypertension] : hypertension [Hyperlipidemia] : hyperlipidemia [Ventricular Tachycardia] : ventricular tachycardia [FreeTextEntry1] : 48-year-old male comes in for followup consultation. Recent revision of his AICD.\par Did not get his blood test done\par He has no chest pain. No PND, orthopnea, or pedal edema.\par He has no palpitation, dizziness, or syncopal episodes.\par He has not had any AICD discharge.\par He does not do regular exercise. His dietary restrictions are not well. He is gaining weight.\par He has exertional dyspnea. At around 100 yards distance. No associated symptoms. Mild severity. Intermittent in nature. Modified with exertion. Relief at rest.\par He has not had any recent hospital admission from cardiac point of view.\par \par I have reviewed available information from his prior cardiologist.

## 2019-04-16 ENCOUNTER — APPOINTMENT (OUTPATIENT)
Dept: CARDIOLOGY | Facility: CLINIC | Age: 49
End: 2019-04-16

## 2019-04-19 ENCOUNTER — MEDICATION RENEWAL (OUTPATIENT)
Age: 49
End: 2019-04-19

## 2019-07-18 ENCOUNTER — APPOINTMENT (OUTPATIENT)
Dept: CARDIOLOGY | Facility: CLINIC | Age: 49
End: 2019-07-18
Payer: MEDICARE

## 2019-07-18 ENCOUNTER — RESULT CHARGE (OUTPATIENT)
Age: 49
End: 2019-07-18

## 2019-07-18 VITALS
HEIGHT: 70 IN | OXYGEN SATURATION: 98 % | HEART RATE: 74 BPM | SYSTOLIC BLOOD PRESSURE: 100 MMHG | BODY MASS INDEX: 40.66 KG/M2 | WEIGHT: 284 LBS | DIASTOLIC BLOOD PRESSURE: 60 MMHG

## 2019-07-18 PROCEDURE — 93282 PRGRMG EVAL IMPLANTABLE DFB: CPT

## 2019-07-18 PROCEDURE — 99214 OFFICE O/P EST MOD 30 MIN: CPT | Mod: 25

## 2019-07-18 NOTE — DISCUSSION/SUMMARY
[FreeTextEntry1] : YOLIS MOORE is a 48 year old M who presents today Jul 18, 2019 with the above history and the following active issues: \par \par #1 Nonischemic, dilated cardiomyopathy. His ejection fraction last checked 30-35% in Jan 2019, class 2 heart failure symptoms without signs of volume overload. His is tolerating optimal medical therapy well including full dose entresto, coreg, hydralazine, aldactone, and torsemide. He denies any new symptoms today. We will plan to recheck his echocardiogram annually unless otherwise indicated. Discussed exertional symptoms such as decreased exercise tolerance, chest discomfort, shortness of breath, orthopnea, or lower extremity edema, which would warrant sooner evaluation. Given his use of multiple diuretics, entresto, etc. we will repeat his labs every 6 months which will be in October. Reviewed most recent set which reveals stable renal function and electrolytes.\par \par #2 AICD. A single lead MDT. Interrogated today. Continue to follow. Stable battery and lead measurements. No events or discharges. He declines remote monitoring. Will cont to check [id number] in office. \par \par #3 hyperlipidemia. lipids very well controlled on simvastatin. cont current dose and lifestyle modification measures.\par \par #4 morbid obesity. Weight reduction is strongly recommended.\par \par #5 sleep apnea. Obstructive. Continue use of CPAP on a regular basis. \par \par #6 history of mitral insufficiency. Mild by recent echo. Surveillance monitoring. No SBE prophylaxis required. \par \par F/U with our office in 6 months with Dr. Macias and ICD checks every 3 months. \par Echo to be planned for Jan 2019 before next office visit.\par Any questions and concerns were addressed and resolved. \par \par Sincerely,\par \par CALDERON Johnson\par Patients history, testing, and plan reviewed with supervising MD: Dr. Dennis Ansari \par

## 2019-07-18 NOTE — PHYSICAL EXAM
[General Appearance - Well Developed] : well developed [Normal Appearance] : normal appearance [Well Groomed] : well groomed [General Appearance - Well Nourished] : well nourished [No Deformities] : no deformities [General Appearance - In No Acute Distress] : no acute distress [Normal Conjunctiva] : the conjunctiva exhibited no abnormalities [No Oral Pallor] : no oral pallor [Normal Jugular Venous A Waves Present] : normal jugular venous A waves present [Normal Jugular Venous V Waves Present] : normal jugular venous V waves present [No Jugular Venous Dockery A Waves] : no jugular venous dockery A waves [Respiration, Rhythm And Depth] : normal respiratory rhythm and effort [Exaggerated Use Of Accessory Muscles For Inspiration] : no accessory muscle use [Auscultation Breath Sounds / Voice Sounds] : lungs were clear to auscultation bilaterally [Heart Rate And Rhythm] : heart rate and rhythm were normal [Heart Sounds] : normal S1 and S2 [Arterial Pulses Normal] : the arterial pulses were normal [Edema] : no peripheral edema present [Veins - Varicosity Changes] : no varicosital changes were noted in the lower extremities [Abdomen Soft] : soft [Abdomen Tenderness] : non-tender [Abnormal Walk] : normal gait [Nail Clubbing] : no clubbing of the fingernails [Cyanosis, Localized] : no localized cyanosis [Skin Color & Pigmentation] : normal skin color and pigmentation [] : no rash [Oriented To Time, Place, And Person] : oriented to person, place, and time [Affect] : the affect was normal [Mood] : the mood was normal [No Anxiety] : not feeling anxious [FreeTextEntry1] : LUSB 1/6 at the base, gallop.

## 2019-07-18 NOTE — ASSESSMENT
[FreeTextEntry1] : past tests for reference:\par \par Coronary angiography. 2004. Normal coronary arteries\par \par Echocardiogram was done on 4/8/2016.  LV ejection fraction 30%.  LVIDD 7.3 cm.  Left atrial size 4.9 cm.  Four-chamber dilatation.  Mild mitral and tricuspid regurgitation.  PAS P 27 mmHg.  Overall no significant change.  LVIDD has been fluctuating in different echocardiograms.  The remaining between 6.6-7.5 cm.\par \par Echocardiogram January 2019 and a ejection fraction 30-35%. LVIDD 6.0. Mild mitral regurgitation. Moderate global LV systolic dysfunction. Concentric LVH. Normal pulmonary pressures.\par \par ICD revision/generator change by Dr. Valentino Feb 2019.\par \par Labs 4/15/19. H/H 11.6/36.2, K 4.5, creat 0.88, AST 21, ALT 24, trig 100, HDL 25, LDL 57,

## 2019-07-18 NOTE — PROCEDURE
[No] : not [NSR] : normal sinus rhythm [ICD] : Implantable cardioverter-defibrillator [VVI] : VVI [Voltage: ___ volts] : Voltage was [unfilled] volts [Threshold Testing Performed] : Threshold testing was performed [Ventricular] : Ventricular [Lead Imp:  ___ohms] : lead impedance was [unfilled] ohms [Sensing Amplitude ___mv] : sensing amplitude was [unfilled] mv [___V @] : [unfilled] V [___ ms] : [unfilled] ms [None] : none [Counters Reset] : the counters were reset [de-identified] : [employer]tronic [de-identified] : Yvon JOSE VR [id number] [de-identified] : [id number] [de-identified] : 2/11/19 [de-identified] : 38 [de-identified] : 10.8 years [de-identified] : Apaced 0%\par Vpaced <0.1%\par \par Pacing settings\par 0.3mv\par 2.25v @ 0.4ms\par \par Shock Therapy settings\par VF at 200bpm: ATP during charge, 35J x6\par FVT via VF at 231bpm: burst (1), 35J x5\par VT: Off\par \par No new recorded events.\par

## 2019-07-18 NOTE — REASON FOR VISIT
[Follow-Up - Clinic] : a clinic follow-up of [Cardiomyopathy] : cardiomyopathy [Heart Failure] : congestive heart failure [Hyperlipidemia] : hyperlipidemia [Hypertension] : hypertension [Ventricular Tachycardia] : ventricular tachycardia [FreeTextEntry1] : 48-year-old male comes in for followup consultation and device interrogation. \par \par Had revision of his AICD Feb 2019, procedure went well and device has been functioning appropriately with no new recorded events on recent interrogations. He has not had any AICD discharge.\par At baseline, He does not do regular exercise. His dietary restrictions are not well.\par However his weight has been overall stable. \par He has no chest pain. No PND, orthopnea, or pedal edema.\par He has no palpitation, dizziness, or syncopal episodes.\par He has exertional dyspnea. At around 100 yards distance. No associated symptoms. Mild severity.\par He has not had any recent hospital admission from cardiac point of view.\par

## 2019-07-18 NOTE — HISTORY OF PRESENT ILLNESS
[FreeTextEntry1] : \par •History of dilated cardiomyopathy, class II functional status, LV end diastolic dimension 6.6. MUGA test earlier in 2014, 42%; ejection fraction by echocardiogram less than 35%, single lead AICD, last MVO2 16 mL/kg/min. It was done by Dr. Lopez. overall dimensions 6.6-7.5 cm. Last echocardiogram, Jan 2019 EF 30-35% with normal PASP.\par \par •Single lead Medtronic AICD.\par \par •Nonsustained ventricular tachycardia. Paroxysmal supraventricular tachycardia/sinus tach. No recurrence.\par \par •Mild non rheumatic valvular heart disease, stable.\par \par •Obstructive sleep apnea on CPAP.\par \par •Obesity, morbid, persistent.\par \par •Dyslipidemia, mixed, on statin therapy.\par \par •History of Lyme disease. No recurrence.\par

## 2019-07-30 ENCOUNTER — MEDICATION RENEWAL (OUTPATIENT)
Age: 49
End: 2019-07-30

## 2019-11-05 ENCOUNTER — APPOINTMENT (OUTPATIENT)
Dept: CARDIOLOGY | Facility: CLINIC | Age: 49
End: 2019-11-05
Payer: MEDICARE

## 2019-11-05 PROCEDURE — 93282 PRGRMG EVAL IMPLANTABLE DFB: CPT

## 2019-11-05 NOTE — REASON FOR VISIT
[Follow-up Device Check] : follow-up device check visit [___ Device Check] : [unfilled] device check [Other ___] : [unfilled]

## 2019-11-05 NOTE — PROCEDURE
[No] : not [NSR] : normal sinus rhythm [ICD] : Implantable cardioverter-defibrillator [VVI] : VVI [Voltage: ___ volts] : Voltage was [unfilled] volts [Threshold Testing Performed] : Threshold testing was performed [Lead Imp:  ___ohms] : lead impedance was [unfilled] ohms [Sensing Amplitude ___mv] : sensing amplitude was [unfilled] mv [___V @] : [unfilled] V [___ ms] : [unfilled] ms [None] : none [de-identified] : [employer] [de-identified] : Yvon JOSE VR [id number] [de-identified] : [id number] [de-identified] : 2/11/19 [de-identified] : 38 [de-identified] : 10.5 years [de-identified] : Apaced 0%\par Vpaced <0.1%\par \par Shock Therapy\par VF at 200bpm: ATP during charge, 35J x6\par FVT via VF at 231bpm: burst (1), 35J x5\par VT: Off\par \par No new recorded events.\par \par Discussed home monitoring with the patient and at this time he declines\par \par Next interrogation in 3 months. \par \par Sincerely,\par \par Leigh Garibay PA-C\par Supervising MD: Dr. Mandujano

## 2020-03-05 ENCOUNTER — APPOINTMENT (OUTPATIENT)
Dept: CARDIOLOGY | Facility: CLINIC | Age: 50
End: 2020-03-05

## 2020-03-23 ENCOUNTER — APPOINTMENT (OUTPATIENT)
Dept: CARDIOLOGY | Facility: CLINIC | Age: 50
End: 2020-03-23

## 2020-05-08 ENCOUNTER — APPOINTMENT (OUTPATIENT)
Dept: CARDIOLOGY | Facility: CLINIC | Age: 50
End: 2020-05-08
Payer: MEDICARE

## 2020-05-08 VITALS — BODY MASS INDEX: 39.8 KG/M2 | WEIGHT: 278 LBS | HEIGHT: 70 IN

## 2020-05-08 PROCEDURE — 99443: CPT

## 2020-05-08 RX ORDER — MUPIROCIN 20 MG/G
2 OINTMENT TOPICAL TWICE DAILY
Qty: 1 | Refills: 2 | Status: DISCONTINUED | COMMUNITY
Start: 2019-02-19 | End: 2020-05-08

## 2020-05-08 RX ORDER — MODAFINIL 100 MG/1
100 TABLET ORAL DAILY
Refills: 0 | Status: DISCONTINUED | COMMUNITY
End: 2020-05-08

## 2020-05-08 NOTE — DISCUSSION/SUMMARY
[FreeTextEntry1] : 49-year-old gentleman with above medical history and active medical problems as noted below\par Today seen and audio visit only.\par #1 nonischemic dilated cardiomyopathy. His ejection fraction last check 30-35% in September 2017 class 2, heart failure symptoms without signs of volume overload on phone discussion with him based on weight swelling.\par No regular exercise noted.\par Dietary restrictions are stable.\par No recent AICD discharge.\par \par Counseling regarding low saturated fat, salt and carbohydrate intake was reviewed. Active lifestyle and regular. Exercise along with weight management is advised.\par Weight reduction is reviewed. I have discussed at length. Exercise and activity level to be increased. Salt restriction, along with other doctors restrictions.\par Compliance with medication reviewed.\par high risk symptoms to notify us were discussed.\par \par #2 AICD. A single lead MDT.  Will be checked in August at present.  No recent AICD discharge.\par #3 hyperlipidemia. Fasting lipid panel ordered. Continue statin therapy.\par #4 morbid obesity. Weight reduction is strongly recommended.\par #5 sleep apnea. Obstructive. Continue use of CPAP on a regular basis. 6 ventricular and supraventricular weakness. Asymptomatic. Continue present management of cardiomyopathy.\par #6 history of mitral insufficiency. Follow up with echocardiogram in presence of dilated cardiomyopathy we will do that when it is safe.\par \par All the above were at length reviewed. Answered all the questions. Thank you very much for this kind referral. Please do not hesitate to give me a call for any question.\par Part of this transcription was done with voice recognition software and phonetically similar errors are common. I apologize for that. Please donot hesitate to call for any questions due to above.\par \par Followup  3 months with ICD check\par

## 2020-05-08 NOTE — ASSESSMENT
[FreeTextEntry1] : past tests for reference:\par \par Coronary angiography. 2004. Normal coronary arteries\par \par Echocardiogram 3/17/2015. LV ejection fraction less than 30%. LVIDD 6.5 left atrial size 4.5 cm, trace mitral tricuspid and aortic regurgitation noted. Overall no significant change compared to before.\par \par  EKG ordered and interpreted by me on 1/12/2016 indication is right failure cardiomyopathy hypertension" by me.  Normal sinus rhythm.  Poor RV progression.  Nonspecific ST-T wave changes.\par Labs 1/6/2016 creatinine is 0.81.  Potassium 4.6 sodium 140 CBC is stable. \par \par Echocardiogram reviewed on 4/14/2016.\par Echocardiogram was done on 4/8/2016.  LV ejection fraction 30%.  LVIDD 7.3 cm.  Left atrial size 4.9 cm.  Four-chamber dilatation.  Mild mitral and tricuspid regurgitation.  PAS P 27 mmHg.  Overall no significant change.  LVIDD has been fluctuating in different echocardiograms.  The remaining between 6.6-7.5 cm.\par \par Echocardiogram September 2017, ejection fraction 25%.\par AICD interrogation in October 2018 close to the ER. I\par EKG July 2018 normal sinus rhythm inferior Q waves LVH, nonspecific T wave changes.\par No recent labs available.\par \par Reviewed on April 15, 2019\par Echocardiogram January 2019 and a ejection fraction 30-35% . LVIDD 6.0. Mild mitral regurgitation. Moderate and judgment. Concentric LVH. Normal pulmonary pressures.\par Dr. Valentino information reviewed in addition to ICD revision\par \par Reviewed on May 8, 2020.\par Labs from April 15, 2020.  Stable CBC, CMP.  Lipid panel.  TSH and magnesium level.

## 2020-05-08 NOTE — HISTORY OF PRESENT ILLNESS
[FreeTextEntry1] : Consent: Patient consented to telephone visit.\par Time: A total of 21 minutes was spent in review of pertinent medical records, discussion with the patient, evaluation of patient problem, and coordination of a care plan as part of this telephone vis\par Physical examination was not performed as a  the risk of COVID-19 cross-contamination to be greater than the benefit to the patient conferred by the physical examination.\par \par 49-year-old gentleman was seen in 2 way audio visit today.  At present he is stable.  Not going out.  Not had any complaint of fever chills cough change in smell nausea vomiting diarrhea.\par He has not had any significant PND orthopnea palpitation dizziness lightheadedness\par He has not had any visual disturbances focal weakness\par He has not had any significant edema.\par He has not had any chest pain\par He has not had any visual disturbances focal weakness\par He denies any significant AICD discharge\par He controls his diet\par He states he has stable weight\par He is compliant with his medications\par He is using his CPAP regularly.\par I have reviewed his labs.\par \par HPI.\par •History of dilated cardiomyopathy, class II functional status, LV end diastolic dimension 6.6. MUGA test earlier in 2014, 42%; ejection fraction by echocardiogram less than 35%, single lead AICD, last MVO2 16 mL/kg/min. It was done by Dr. Lopez. 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

## 2020-05-08 NOTE — REVIEW OF SYSTEMS
[see HPI] : see HPI [Shortness Of Breath] : shortness of breath [Chest  Pressure] : no chest pressure [Chest Pain] : no chest pain [Dyspnea on exertion] : dyspnea during exertion [Lower Ext Edema] : no extremity edema [Leg Claudication] : no intermittent leg claudication [Palpitations] : no palpitations [Negative] : Heme/Lymph

## 2020-07-16 ENCOUNTER — APPOINTMENT (OUTPATIENT)
Dept: CT IMAGING | Facility: CLINIC | Age: 50
End: 2020-07-16
Payer: MEDICARE

## 2020-07-16 PROCEDURE — 70492 CT SFT TSUE NCK W/O & W/DYE: CPT

## 2020-07-16 PROCEDURE — Q9967E: CUSTOM

## 2020-08-07 ENCOUNTER — NON-APPOINTMENT (OUTPATIENT)
Age: 50
End: 2020-08-07

## 2020-08-07 ENCOUNTER — APPOINTMENT (OUTPATIENT)
Dept: CARDIOLOGY | Facility: CLINIC | Age: 50
End: 2020-08-07
Payer: MEDICARE

## 2020-08-07 VITALS
SYSTOLIC BLOOD PRESSURE: 100 MMHG | HEART RATE: 64 BPM | DIASTOLIC BLOOD PRESSURE: 60 MMHG | TEMPERATURE: 97.1 F | OXYGEN SATURATION: 99 % | WEIGHT: 274 LBS | BODY MASS INDEX: 39.22 KG/M2 | HEIGHT: 70 IN

## 2020-08-07 PROCEDURE — 93282 PRGRMG EVAL IMPLANTABLE DFB: CPT

## 2020-08-07 PROCEDURE — 99214 OFFICE O/P EST MOD 30 MIN: CPT

## 2020-08-07 PROCEDURE — 99214 OFFICE O/P EST MOD 30 MIN: CPT | Mod: 25

## 2020-08-07 PROCEDURE — 93000 ELECTROCARDIOGRAM COMPLETE: CPT | Mod: 59

## 2020-08-27 ENCOUNTER — APPOINTMENT (OUTPATIENT)
Dept: CARDIOLOGY | Facility: CLINIC | Age: 50
End: 2020-08-27
Payer: MEDICARE

## 2020-08-27 PROCEDURE — 93306 TTE W/DOPPLER COMPLETE: CPT

## 2020-08-28 ENCOUNTER — APPOINTMENT (OUTPATIENT)
Dept: OTOLARYNGOLOGY | Facility: CLINIC | Age: 50
End: 2020-08-28
Payer: MEDICARE

## 2020-08-28 ENCOUNTER — LABORATORY RESULT (OUTPATIENT)
Age: 50
End: 2020-08-28

## 2020-08-28 VITALS
DIASTOLIC BLOOD PRESSURE: 79 MMHG | HEART RATE: 80 BPM | WEIGHT: 275.2 LBS | SYSTOLIC BLOOD PRESSURE: 128 MMHG | BODY MASS INDEX: 39.49 KG/M2

## 2020-08-28 PROCEDURE — 99204 OFFICE O/P NEW MOD 45 MIN: CPT | Mod: 25

## 2020-08-28 PROCEDURE — 31575 DIAGNOSTIC LARYNGOSCOPY: CPT

## 2020-08-28 PROCEDURE — 10005 FNA BX W/US GDN 1ST LES: CPT

## 2020-08-28 NOTE — PHYSICAL EXAM
[de-identified] : Large R thyroid goiter with deviation of the laryngotracheal complex to the L. [Midline] : trachea located in midline position [Normal] : no rashes

## 2020-08-28 NOTE — CONSULT LETTER
[Consult Letter:] : I had the pleasure of evaluating your patient, [unfilled]. [Please see my note below.] : Please see my note below. [Dear  ___] : Dear  [unfilled], [Consult Closing:] : Thank you very much for allowing me to participate in the care of this patient.  If you have any questions, please do not hesitate to contact me. [Sincerely,] : Sincerely, [FreeTextEntry2] : Dr Ashok Hinson [FreeTextEntry3] : \par Gavino Sanchez MD, FACS\par \par Otolaryngology-Head and Neck Surgery\par [employer] [facility]\par

## 2020-08-28 NOTE — HISTORY OF PRESENT ILLNESS
[de-identified] : This is a patient referred by Dr. Hinson for a R thyroid mass. This was found years ago during physical exam. CT done on 7/16/2020 at [facility].  \par No dysphagia, dysphonia or dyspnea.\par Patient denies h/o radiation and has no family h/o thyroid cancer.\par

## 2020-09-16 NOTE — PHYSICAL EXAM
[Well Groomed] : well groomed [Normal Appearance] : normal appearance [General Appearance - Well Developed] : well developed [No Deformities] : no deformities [General Appearance - Well Nourished] : well nourished [Normal Conjunctiva] : the conjunctiva exhibited no abnormalities [General Appearance - In No Acute Distress] : no acute distress [No Oral Pallor] : no oral pallor [Respiration, Rhythm And Depth] : normal respiratory rhythm and effort [Exaggerated Use Of Accessory Muscles For Inspiration] : no accessory muscle use [Heart Rate And Rhythm] : heart rate and rhythm were normal [Auscultation Breath Sounds / Voice Sounds] : lungs were clear to auscultation bilaterally [Heart Sounds] : normal S1 and S2 [Arterial Pulses Normal] : the arterial pulses were normal [Veins - Varicosity Changes] : no varicosital changes were noted in the lower extremities [Edema] : no peripheral edema present [Abdomen Tenderness] : non-tender [Abnormal Walk] : normal gait [Abdomen Soft] : soft [Cyanosis, Localized] : no localized cyanosis [Nail Clubbing] : no clubbing of the fingernails [] : no rash [Oriented To Time, Place, And Person] : oriented to person, place, and time [Skin Color & Pigmentation] : normal skin color and pigmentation [No Anxiety] : not feeling anxious [Mood] : the mood was normal [Affect] : the affect was normal [FreeTextEntry1] : LUSB 1/6 at the base

## 2020-09-16 NOTE — DISCUSSION/SUMMARY
[FreeTextEntry1] : YOLIS MOORE is a 49 year old M who presents today Aug 07, 2020 with the above history and the following active issues: \par \par #1 Nonischemic, dilated cardiomyopathy. His ejection fraction last checked 30-35% in Jan 2019, class 2 heart failure symptoms without signs of volume overload. His is tolerating optimal medical therapy well including full dose entresto, coreg, hydralazine, aldactone, and torsemide. Renal function and electrolytes are stable per recent labs. He denies any new symptoms today. We will plan to recheck his echocardiogram to monitor LVEF. \par \par #2 AICD. A single lead MDT. Interrogated today. NSVT x 2 seconds on 2/9/20, asymptomatic on high dose beta blocker. No therapies. Continue to follow. Stable battery and lead measurements. He declines remote monitoring. Will cont to check [id number] in office. \par \par #3 hyperlipidemia. lipids very well controlled on simvastatin. cont current dose and lifestyle modification measures.\par \par #4 morbid obesity. Weight reduction is strongly recommended.\par \par #5 sleep apnea. Obstructive. Continue use of CPAP on a regular basis. \par \par #6 history of mitral insufficiency. Surveillance monitoring. No SBE prophylaxis required. \par \par #7 Preop cardiac clearance for thyroidectomy, date TBD\par No acute findings by EKG and pt asymptomatic with acceptable functional status. Given above NSVT and h/o NI-CMP will obtain a echo prior to clearance and addend this note with recommendations. He may hold ASA 5-7 days prior to surgery and restart as soon as hemodynamically stable but will need to continue beta blocker, entresto, and diuretics through the perioperative period. \par \par F/U with our office in 6 months with Dr. Macias and ICD checks every 3 months. \par Any questions and concerns were addressed and resolved. \par \par Sincerely,\par \par CALDERON Johnson\par Patients history, testing, and plan reviewed with supervising MD: Dr. Sita Macias

## 2020-09-16 NOTE — REASON FOR VISIT
[Follow-Up - Clinic] : a clinic follow-up of [Cardiomyopathy] : cardiomyopathy [Hyperlipidemia] : hyperlipidemia [Heart Failure] : congestive heart failure [Ventricular Tachycardia] : ventricular tachycardia [Hypertension] : hypertension [FreeTextEntry1] : 49-year-old male comes in for followup consultation and device interrogation. \par \par Overall doing well. There has been no recent illness or hospitalization. \par He has significantly enlarged thyroid requiring thyroidectomy, likely to be done at [facility] but not yet scheduled. He states there was no malignancy found. \par \par At baseline, He does not do regular exercise. His dietary restrictions are not well.\par However his weight has been overall stable. \par He has no chest pain. No PND, orthopnea, or pedal edema.\par He has no palpitation, dizziness, or syncopal episodes.\par He has exertional dyspnea. At around 100 yards distance. No associated symptoms. Mild severity.\par \par

## 2020-09-16 NOTE — ASSESSMENT
[FreeTextEntry1] : past tests for reference:\par \par Coronary angiography. 2004. Normal coronary arteries\par \par Echocardiogram was done on 4/8/2016.  LV ejection fraction 30%.  LVIDD 7.3 cm.  Left atrial size 4.9 cm.  Four-chamber dilatation.  Mild mitral and tricuspid regurgitation.  PAS P 27 mmHg.  Overall no significant change.  LVIDD has been fluctuating in different echocardiograms.  The remaining between 6.6-7.5 cm.\par \par Echocardiogram January 2019 and a ejection fraction 30-35%. LVIDD 6.0. Mild mitral regurgitation. Moderate global LV systolic dysfunction. Concentric LVH. Normal pulmonary pressures.\par \par ICD revision/generator change by Dr. Valentino Feb 2019.\par \par Labs 4/15/2020, hgb 11.6, k 3.9, creat 0.86, LDL 72, A1c 5.6, TSH 1.74

## 2020-09-16 NOTE — HISTORY OF PRESENT ILLNESS
[FreeTextEntry1] : \par •History of dilated cardiomyopathy, class II functional status, LV end diastolic dimension 6.6. MUGA test earlier in 2014, 42%; ejection fraction by echocardiogram less than 35%, single lead AICD, last MVO2 16 mL/kg/min. It was done by Dr. Lopez. overall dimensions 6.6-7.5 cm. Last echocardiogram, Jan 2019 EF 30-35% with normal PASP.\par \par •Single lead Medtronic AICD. S/P gen change Feb 2019. \par \par •Nonsustained ventricular tachycardia. Paroxysmal supraventricular tachycardia/sinus tach. On high dose beta blocker. \par \par •Mild non rheumatic valvular heart disease, stable.\par \par •Obstructive sleep apnea on CPAP.\par \par •Obesity, morbid, persistent.\par \par •Dyslipidemia, mixed, on statin therapy.\par \par •History of Lyme disease. No recurrence.\par

## 2020-09-16 NOTE — ADDENDUM
[FreeTextEntry1] : \par 8/31/20:\par Reviewed echocardiogram done 8/27/20, LVEF 30-35% with moderate global LV systolic dysfunction. Mild VHD noted. These findings show no significant changes from prior study, therefore pt is presently optimized from a cardiac standpoint to proceed with planned *Thyroidectomy*. Further preop recommendations noted above. Please do not hesitate to call for any questions or concerns.

## 2020-09-18 ENCOUNTER — OUTPATIENT (OUTPATIENT)
Dept: OUTPATIENT SERVICES | Facility: HOSPITAL | Age: 50
LOS: 1 days | End: 2020-09-18

## 2020-09-18 VITALS
HEIGHT: 70 IN | WEIGHT: 279.11 LBS | HEART RATE: 74 BPM | RESPIRATION RATE: 18 BRPM | TEMPERATURE: 98 F | OXYGEN SATURATION: 98 % | SYSTOLIC BLOOD PRESSURE: 118 MMHG | DIASTOLIC BLOOD PRESSURE: 76 MMHG

## 2020-09-18 DIAGNOSIS — G47.33 OBSTRUCTIVE SLEEP APNEA (ADULT) (PEDIATRIC): ICD-10-CM

## 2020-09-18 DIAGNOSIS — E04.9 NONTOXIC GOITER, UNSPECIFIED: ICD-10-CM

## 2020-09-18 DIAGNOSIS — Z95.810 PRESENCE OF AUTOMATIC (IMPLANTABLE) CARDIAC DEFIBRILLATOR: Chronic | ICD-10-CM

## 2020-09-18 DIAGNOSIS — Z95.810 PRESENCE OF AUTOMATIC (IMPLANTABLE) CARDIAC DEFIBRILLATOR: ICD-10-CM

## 2020-09-18 DIAGNOSIS — Z98.890 OTHER SPECIFIED POSTPROCEDURAL STATES: Chronic | ICD-10-CM

## 2020-09-18 LAB
ALBUMIN SERPL ELPH-MCNC: 4.6 G/DL — SIGNIFICANT CHANGE UP (ref 3.3–5)
ALP SERPL-CCNC: 51 U/L — SIGNIFICANT CHANGE UP (ref 40–120)
ALT FLD-CCNC: 24 U/L — SIGNIFICANT CHANGE UP (ref 4–41)
ANION GAP SERPL CALC-SCNC: 12 MMO/L — SIGNIFICANT CHANGE UP (ref 7–14)
AST SERPL-CCNC: 22 U/L — SIGNIFICANT CHANGE UP (ref 4–40)
BILIRUB SERPL-MCNC: 0.4 MG/DL — SIGNIFICANT CHANGE UP (ref 0.2–1.2)
BUN SERPL-MCNC: 11 MG/DL — SIGNIFICANT CHANGE UP (ref 7–23)
CALCIUM SERPL-MCNC: 9.7 MG/DL — SIGNIFICANT CHANGE UP (ref 8.4–10.5)
CHLORIDE SERPL-SCNC: 100 MMOL/L — SIGNIFICANT CHANGE UP (ref 98–107)
CO2 SERPL-SCNC: 25 MMOL/L — SIGNIFICANT CHANGE UP (ref 22–31)
CREAT SERPL-MCNC: 0.97 MG/DL — SIGNIFICANT CHANGE UP (ref 0.5–1.3)
GLUCOSE SERPL-MCNC: 79 MG/DL — SIGNIFICANT CHANGE UP (ref 70–99)
HCT VFR BLD CALC: 35.2 % — LOW (ref 39–50)
HGB BLD-MCNC: 11.2 G/DL — LOW (ref 13–17)
MCHC RBC-ENTMCNC: 27 PG — SIGNIFICANT CHANGE UP (ref 27–34)
MCHC RBC-ENTMCNC: 31.8 % — LOW (ref 32–36)
MCV RBC AUTO: 84.8 FL — SIGNIFICANT CHANGE UP (ref 80–100)
NRBC # FLD: 0 K/UL — SIGNIFICANT CHANGE UP (ref 0–0)
PLATELET # BLD AUTO: 275 K/UL — SIGNIFICANT CHANGE UP (ref 150–400)
PMV BLD: 9.7 FL — SIGNIFICANT CHANGE UP (ref 7–13)
POTASSIUM SERPL-MCNC: 3.9 MMOL/L — SIGNIFICANT CHANGE UP (ref 3.5–5.3)
POTASSIUM SERPL-SCNC: 3.9 MMOL/L — SIGNIFICANT CHANGE UP (ref 3.5–5.3)
PROT SERPL-MCNC: 8 G/DL — SIGNIFICANT CHANGE UP (ref 6–8.3)
RBC # BLD: 4.15 M/UL — LOW (ref 4.2–5.8)
RBC # FLD: 12.8 % — SIGNIFICANT CHANGE UP (ref 10.3–14.5)
SODIUM SERPL-SCNC: 137 MMOL/L — SIGNIFICANT CHANGE UP (ref 135–145)
WBC # BLD: 10.47 K/UL — SIGNIFICANT CHANGE UP (ref 3.8–10.5)
WBC # FLD AUTO: 10.47 K/UL — SIGNIFICANT CHANGE UP (ref 3.8–10.5)

## 2020-09-18 RX ORDER — SODIUM CHLORIDE 9 MG/ML
1000 INJECTION, SOLUTION INTRAVENOUS
Refills: 0 | Status: DISCONTINUED | OUTPATIENT
Start: 2020-09-23 | End: 2020-10-07

## 2020-09-18 RX ORDER — SODIUM CHLORIDE 9 MG/ML
3 INJECTION INTRAMUSCULAR; INTRAVENOUS; SUBCUTANEOUS EVERY 8 HOURS
Refills: 0 | Status: DISCONTINUED | OUTPATIENT
Start: 2020-09-23 | End: 2020-10-07

## 2020-09-18 NOTE — H&P PST ADULT - NEGATIVE RESPIRATORY AND THORAX SYMPTOMS
no dyspnea/no cough/no hemoptysis/no pleuritic chest pain no hemoptysis/no wheezing/no dyspnea/no cough/no pleuritic chest pain

## 2020-09-18 NOTE — H&P PST ADULT - NSICDXPROBLEM_GEN_ALL_CORE_FT
PROBLEM DIAGNOSES  Problem: Nontoxic goiter, unspecified  Assessment and Plan: Scheduled for right thyroid lobectomy on 09/23/2020. Pre op instructions, famotidine., chlorhexidine gluconate soap given and explained. Pt verbalized understanding.   Pt has scheduled appt for Covid-19 swab on 09/20/2020    Problem: History of implantable cardiac defibrillator (ICD)  Assessment and Plan: OR booking notified via fax  Cardialogy consultation in chart    Problem: TAMAR on CPAP  Assessment and Plan: OR booking notified via fax

## 2020-09-18 NOTE — H&P PST ADULT - NEGATIVE GASTROINTESTINAL SYMPTOMS
no vomiting/no flatulence/no abdominal pain/no diarrhea/no nausea/no change in bowel habits no vomiting/no diarrhea/no constipation/no abdominal pain/no nausea/no melena/no jaundice/no hiccoughs/no change in bowel habits

## 2020-09-18 NOTE — H&P PST ADULT - RS GEN PE MLT RESP DETAILS PC
no rales/no wheezes/breath sounds equal/good air movement/airway patent/respirations non-labored/no chest wall tenderness/no intercostal retractions/no rhonchi/clear to auscultation bilaterally

## 2020-09-18 NOTE — H&P PST ADULT - HISTORY OF PRESENT ILLNESS
48 y/o Black male with PMHx of HTN, Dyslipidemia, TAMAR o CPAO, morbid obesity   h/o right thyroid mass x 4 years ago, increased in size, eval by PCO who the referred him to surgeon. Now scheduled for right thyroid lobectomy on 09/23/2020 50 y/o Black male with PMHx of HTN, Dyslipidemia, TAMAR on CPAP, morbid obesity presents to PST for pre op evaluation with h/o right thyroid mass x 4 years ago, increased in size, eval by PCP who then referred him to surgeon. S/P biopsy of thyroid gland. Now scheduled for right thyroid lobectomy on 09/23/2020 50 y/o Black male with PMHx of HTN, CAD- S/P Medtronic ICD - Model [id number] Evera XT VR implanted 02/11/2019; Dyslipidemia, TAMAR on CPAP, morbid obesity presents to PST for pre op evaluation with h/o right thyroid mass x 4 years ago, increased in size, eval by PCP who then referred him to surgeon. S/P biopsy of thyroid gland. Now scheduled for right thyroid lobectomy on 09/23/2020

## 2020-09-18 NOTE — H&P PST ADULT - NEGATIVE ENMT SYMPTOMS
no tinnitus/no vertigo/no nasal discharge/no post-nasal discharge/no ear pain/no nasal congestion/no nasal obstruction/no nose bleeds/no abnormal taste sensation/no recurrent cold sores

## 2020-09-18 NOTE — H&P PST ADULT - NEGATIVE OPHTHALMOLOGIC SYMPTOMS
no diplopia/no photophobia/no lacrimation L/no lacrimation R/no pain R/no loss of vision L/no loss of vision R/no discharge L/no pain L/no irritation L/no discharge R/no irritation R/no blurred vision L/no blurred vision R

## 2020-09-18 NOTE — H&P PST ADULT - NSICDXPASTMEDICALHX_GEN_ALL_CORE_FT
PAST MEDICAL HISTORY:  HTN (hypertension)      PAST MEDICAL HISTORY:  CAD (coronary artery disease)     Dyslipidemia     HTN (hypertension)     Morbid obesity     TAMAR on CPAP

## 2020-09-20 ENCOUNTER — APPOINTMENT (OUTPATIENT)
Dept: DISASTER EMERGENCY | Facility: CLINIC | Age: 50
End: 2020-09-20

## 2020-09-21 LAB — SARS-COV-2 N GENE NPH QL NAA+PROBE: NOT DETECTED

## 2020-09-22 ENCOUNTER — TRANSCRIPTION ENCOUNTER (OUTPATIENT)
Age: 50
End: 2020-09-22

## 2020-09-22 PROBLEM — E66.01 MORBID (SEVERE) OBESITY DUE TO EXCESS CALORIES: Chronic | Status: ACTIVE | Noted: 2020-09-18

## 2020-09-22 PROBLEM — I10 ESSENTIAL (PRIMARY) HYPERTENSION: Chronic | Status: ACTIVE | Noted: 2020-09-18

## 2020-09-22 PROBLEM — G47.33 OBSTRUCTIVE SLEEP APNEA (ADULT) (PEDIATRIC): Chronic | Status: ACTIVE | Noted: 2020-09-18

## 2020-09-22 PROBLEM — E78.5 HYPERLIPIDEMIA, UNSPECIFIED: Chronic | Status: ACTIVE | Noted: 2020-09-18

## 2020-09-22 PROBLEM — I25.10 ATHEROSCLEROTIC HEART DISEASE OF NATIVE CORONARY ARTERY WITHOUT ANGINA PECTORIS: Chronic | Status: ACTIVE | Noted: 2020-09-18

## 2020-09-22 NOTE — ASU PATIENT PROFILE, ADULT - PMH
CAD (coronary artery disease)    Dyslipidemia    HTN (hypertension)    Morbid obesity    TAMAR on CPAP

## 2020-09-22 NOTE — ASU PATIENT PROFILE, ADULT - PSH
H/O arthroscopy of knee  left patella; 2 years ago  History of implantable cardiac defibrillator (ICD)  [employer] [id number] Evera Xt VR implanted on 02/11/2019

## 2020-09-23 ENCOUNTER — APPOINTMENT (OUTPATIENT)
Dept: OTOLARYNGOLOGY | Facility: HOSPITAL | Age: 50
End: 2020-09-23

## 2020-09-23 ENCOUNTER — OUTPATIENT (OUTPATIENT)
Dept: OUTPATIENT SERVICES | Facility: HOSPITAL | Age: 50
LOS: 1 days | Discharge: HOME | End: 2020-09-23

## 2020-09-23 VITALS
HEART RATE: 74 BPM | OXYGEN SATURATION: 98 % | SYSTOLIC BLOOD PRESSURE: 118 MMHG | WEIGHT: 279.11 LBS | HEIGHT: 70 IN | TEMPERATURE: 98 F | DIASTOLIC BLOOD PRESSURE: 76 MMHG | RESPIRATION RATE: 18 BRPM

## 2020-09-23 VITALS
DIASTOLIC BLOOD PRESSURE: 62 MMHG | RESPIRATION RATE: 18 BRPM | HEART RATE: 72 BPM | SYSTOLIC BLOOD PRESSURE: 123 MMHG | OXYGEN SATURATION: 100 %

## 2020-09-23 DIAGNOSIS — Z98.890 OTHER SPECIFIED POSTPROCEDURAL STATES: Chronic | ICD-10-CM

## 2020-09-23 DIAGNOSIS — Z95.810 PRESENCE OF AUTOMATIC (IMPLANTABLE) CARDIAC DEFIBRILLATOR: Chronic | ICD-10-CM

## 2020-10-23 ENCOUNTER — OUTPATIENT (OUTPATIENT)
Dept: OUTPATIENT SERVICES | Facility: HOSPITAL | Age: 50
LOS: 1 days | End: 2020-10-23

## 2020-10-23 VITALS
HEIGHT: 71 IN | OXYGEN SATURATION: 98 % | SYSTOLIC BLOOD PRESSURE: 120 MMHG | DIASTOLIC BLOOD PRESSURE: 80 MMHG | WEIGHT: 279.99 LBS | TEMPERATURE: 98 F | RESPIRATION RATE: 16 BRPM | HEART RATE: 66 BPM

## 2020-10-23 DIAGNOSIS — Z95.810 PRESENCE OF AUTOMATIC (IMPLANTABLE) CARDIAC DEFIBRILLATOR: Chronic | ICD-10-CM

## 2020-10-23 DIAGNOSIS — Z98.890 OTHER SPECIFIED POSTPROCEDURAL STATES: Chronic | ICD-10-CM

## 2020-10-23 DIAGNOSIS — R20.0 ANESTHESIA OF SKIN: ICD-10-CM

## 2020-10-23 DIAGNOSIS — E04.9 NONTOXIC GOITER, UNSPECIFIED: ICD-10-CM

## 2020-10-23 DIAGNOSIS — G47.33 OBSTRUCTIVE SLEEP APNEA (ADULT) (PEDIATRIC): ICD-10-CM

## 2020-10-23 DIAGNOSIS — Z95.810 PRESENCE OF AUTOMATIC (IMPLANTABLE) CARDIAC DEFIBRILLATOR: ICD-10-CM

## 2020-10-23 LAB
HCT VFR BLD CALC: 35.4 % — LOW (ref 39–50)
HGB BLD-MCNC: 11.1 G/DL — LOW (ref 13–17)
MCHC RBC-ENTMCNC: 26.6 PG — LOW (ref 27–34)
MCHC RBC-ENTMCNC: 31.4 % — LOW (ref 32–36)
MCV RBC AUTO: 84.7 FL — SIGNIFICANT CHANGE UP (ref 80–100)
NRBC # FLD: 0 K/UL — SIGNIFICANT CHANGE UP (ref 0–0)
PLATELET # BLD AUTO: 271 K/UL — SIGNIFICANT CHANGE UP (ref 150–400)
PMV BLD: 10 FL — SIGNIFICANT CHANGE UP (ref 7–13)
RBC # BLD: 4.18 M/UL — LOW (ref 4.2–5.8)
RBC # FLD: 12.7 % — SIGNIFICANT CHANGE UP (ref 10.3–14.5)
WBC # BLD: 11.33 K/UL — HIGH (ref 3.8–10.5)
WBC # FLD AUTO: 11.33 K/UL — HIGH (ref 3.8–10.5)

## 2020-10-23 NOTE — H&P PST ADULT - NEGATIVE ENMT SYMPTOMS
no nasal congestion/no tinnitus/no vertigo/no ear pain/no nasal discharge/no nasal obstruction no tinnitus/no vertigo/no nasal congestion/no nasal obstruction/no nasal discharge/no hearing difficulty/no ear pain

## 2020-10-23 NOTE — H&P PST ADULT - NEGATIVE CARDIOVASCULAR SYMPTOMS
no dyspnea on exertion/no chest pain/no paroxysmal nocturnal dyspnea/no peripheral edema/no palpitations/no orthopnea/no claudication

## 2020-10-23 NOTE — H&P PST ADULT - NEGATIVE OPHTHALMOLOGIC SYMPTOMS
no photophobia/no discharge L/no blurred vision L/no blurred vision R/no discharge R/no lacrimation L/no lacrimation R/no diplopia

## 2020-10-23 NOTE — H&P PST ADULT - ANESTHESIA, PREVIOUS REACTION, PROFILE
Difficult intubation 9/26/20 " Omega shaped epiglottis and displacement of the laryngopharyngal complex to the left with bowing of the right AE fold "

## 2020-10-23 NOTE — H&P PST ADULT - MUSCULOSKELETAL
details… No joint pain, swelling or deformity; no limitation of movement detailed exam normal strength/no joint swelling/ROM intact/no calf tenderness

## 2020-10-23 NOTE — H&P PST ADULT - NSICDXPASTMEDICALHX_GEN_ALL_CORE_FT
PAST MEDICAL HISTORY:  CAD (coronary artery disease)     Dyslipidemia     HTN (hypertension)     Morbid obesity     TAMAR on CPAP      PAST MEDICAL HISTORY:  CAD (coronary artery disease)     Dyslipidemia     H/O CHF     HTN (hypertension)     Morbid obesity     Nontoxic goiter, unspecified     TAMAR on CPAP

## 2020-10-23 NOTE — H&P PST ADULT - NEGATIVE GENERAL GENITOURINARY SYMPTOMS
no renal colic/no flank pain R/no bladder infections/normal urinary frequency/no flank pain L/no hematuria

## 2020-10-23 NOTE — H&P PST ADULT - NSICDXPASTSURGICALHX_GEN_ALL_CORE_FT
PAST SURGICAL HISTORY:  H/O arthroscopy of knee left patella; 2 years ago    History of implantable cardiac defibrillator (ICD) [employer] [id number] Evera Xt VR implanted on 02/11/2019

## 2020-10-23 NOTE — H&P PST ADULT - HISTORY OF PRESENT ILLNESS
50 y/o Black male with PMHx of HTN, CAD- S/P Medtronic ICD - Model [id number] Evera XT VR implanted 02/11/2019; Dyslipidemia, TAMAR on CPAP, morbid obesity presents to PST for pre op evaluation with h/o right thyroid mass x 4 years ago, increased in size, eval by PCP who then referred him to surgeon. S/P biopsy of thyroid gland. Now scheduled for right thyroid lobectomy 48 y/o Black male with PMHx of HTN, CAD- S/P Medtronic ICD - Model [id number] Evera XT VR implanted 02/11/2019; Dyslipidemia, TAMAR on CPAP, morbid obesity presents to PST for pre op evaluation with h/o right thyroid mass x 4 years ago, increased in size, eval by PCP who then referred him to surgeon. S/P biopsy of thyroid gland. Now scheduled for right thyroid lobectomy. Patient was previously scheduled for surgery on 9/23/20 but aborted due to difficult intubation.

## 2020-10-23 NOTE — H&P PST ADULT - NEGATIVE GASTROINTESTINAL SYMPTOMS
no diarrhea/no change in bowel habits/no jaundice/no vomiting/no melena/no nausea/no constipation/no abdominal pain/no hiccoughs

## 2020-10-23 NOTE — H&P PST ADULT - RS GEN PE MLT RESP DETAILS PC
no chest wall tenderness/good air movement/airway patent/breath sounds equal/no intercostal retractions/respirations non-labored/no rhonchi/no wheezes/no rales/clear to auscultation bilaterally

## 2020-10-23 NOTE — H&P PST ADULT - NECK DETAILS
palpable right thyroid mass/supple/thyroid nodule/no JVD palpable right thyroid mass, ROM limited/supple/no JVD/thyroid nodule

## 2020-10-27 ENCOUNTER — APPOINTMENT (OUTPATIENT)
Dept: DISASTER EMERGENCY | Facility: CLINIC | Age: 50
End: 2020-10-27

## 2020-10-29 ENCOUNTER — TRANSCRIPTION ENCOUNTER (OUTPATIENT)
Age: 50
End: 2020-10-29

## 2020-10-29 LAB — SARS-COV-2 N GENE NPH QL NAA+PROBE: NOT DETECTED

## 2020-10-29 NOTE — ASU PATIENT PROFILE, ADULT - PMH
CAD (coronary artery disease)    Dyslipidemia    H/O CHF    HTN (hypertension)    Morbid obesity    Nontoxic goiter, unspecified    TAMAR on CPAP

## 2020-10-29 NOTE — ASU PATIENT PROFILE, ADULT - PSH
H/O arthroscopy of knee  left patella; 2 years ago  History of implantable cardiac defibrillator (ICD)  [employer] [id number] Evera Xt VR implanted on 02/11/2019

## 2020-10-30 ENCOUNTER — RESULT REVIEW (OUTPATIENT)
Age: 50
End: 2020-10-30

## 2020-10-30 ENCOUNTER — INPATIENT (INPATIENT)
Facility: HOSPITAL | Age: 50
LOS: 1 days | Discharge: HOME | End: 2020-11-01
Attending: OTOLARYNGOLOGY | Admitting: OTOLARYNGOLOGY
Payer: MEDICARE

## 2020-10-30 ENCOUNTER — APPOINTMENT (OUTPATIENT)
Dept: OTOLARYNGOLOGY | Facility: HOSPITAL | Age: 50
End: 2020-10-30

## 2020-10-30 VITALS
OXYGEN SATURATION: 99 % | HEART RATE: 74 BPM | TEMPERATURE: 99 F | SYSTOLIC BLOOD PRESSURE: 130 MMHG | DIASTOLIC BLOOD PRESSURE: 63 MMHG | WEIGHT: 279.99 LBS | RESPIRATION RATE: 16 BRPM | HEIGHT: 71 IN

## 2020-10-30 DIAGNOSIS — Z98.890 OTHER SPECIFIED POSTPROCEDURAL STATES: Chronic | ICD-10-CM

## 2020-10-30 DIAGNOSIS — Z95.810 PRESENCE OF AUTOMATIC (IMPLANTABLE) CARDIAC DEFIBRILLATOR: Chronic | ICD-10-CM

## 2020-10-30 DIAGNOSIS — E04.9 NONTOXIC GOITER, UNSPECIFIED: ICD-10-CM

## 2020-10-30 LAB
HCT VFR BLD CALC: 32.5 % — LOW (ref 39–50)
HGB BLD-MCNC: 10.4 G/DL — LOW (ref 13–17)
MCHC RBC-ENTMCNC: 26.6 PG — LOW (ref 27–34)
MCHC RBC-ENTMCNC: 32 % — SIGNIFICANT CHANGE UP (ref 32–36)
MCV RBC AUTO: 83.1 FL — SIGNIFICANT CHANGE UP (ref 80–100)
NRBC # FLD: 0 K/UL — SIGNIFICANT CHANGE UP (ref 0–0)
PLATELET # BLD AUTO: 237 K/UL — SIGNIFICANT CHANGE UP (ref 150–400)
PMV BLD: 10.2 FL — SIGNIFICANT CHANGE UP (ref 7–13)
RBC # BLD: 3.91 M/UL — LOW (ref 4.2–5.8)
RBC # FLD: 13 % — SIGNIFICANT CHANGE UP (ref 10.3–14.5)
WBC # BLD: 15.02 K/UL — HIGH (ref 3.8–10.5)
WBC # FLD AUTO: 15.02 K/UL — HIGH (ref 3.8–10.5)

## 2020-10-30 PROCEDURE — 60271 REMOVAL OF THYROID: CPT

## 2020-10-30 PROCEDURE — 88307 TISSUE EXAM BY PATHOLOGIST: CPT | Mod: 26

## 2020-10-30 RX ORDER — SPIRONOLACTONE 25 MG/1
50 TABLET, FILM COATED ORAL DAILY
Refills: 0 | Status: DISCONTINUED | OUTPATIENT
Start: 2020-10-30 | End: 2020-10-30

## 2020-10-30 RX ORDER — POTASSIUM CHLORIDE 20 MEQ
1 PACKET (EA) ORAL
Qty: 0 | Refills: 0 | DISCHARGE

## 2020-10-30 RX ORDER — POTASSIUM CHLORIDE 20 MEQ
20 PACKET (EA) ORAL ONCE
Refills: 0 | Status: COMPLETED | OUTPATIENT
Start: 2020-10-30 | End: 2020-10-30

## 2020-10-30 RX ORDER — ONDANSETRON 8 MG/1
4 TABLET, FILM COATED ORAL EVERY 4 HOURS
Refills: 0 | Status: DISCONTINUED | OUTPATIENT
Start: 2020-10-30 | End: 2020-11-01

## 2020-10-30 RX ORDER — HYDROMORPHONE HYDROCHLORIDE 2 MG/ML
0.5 INJECTION INTRAMUSCULAR; INTRAVENOUS; SUBCUTANEOUS EVERY 4 HOURS
Refills: 0 | Status: DISCONTINUED | OUTPATIENT
Start: 2020-10-30 | End: 2020-10-31

## 2020-10-30 RX ORDER — CARVEDILOL PHOSPHATE 80 MG/1
6.25 CAPSULE, EXTENDED RELEASE ORAL
Refills: 0 | Status: DISCONTINUED | OUTPATIENT
Start: 2020-10-30 | End: 2020-10-30

## 2020-10-30 RX ORDER — CHOLECALCIFEROL (VITAMIN D3) 125 MCG
1000 CAPSULE ORAL DAILY
Refills: 0 | Status: DISCONTINUED | OUTPATIENT
Start: 2020-10-30 | End: 2020-11-01

## 2020-10-30 RX ORDER — CARVEDILOL PHOSPHATE 80 MG/1
1 CAPSULE, EXTENDED RELEASE ORAL
Qty: 0 | Refills: 0 | DISCHARGE

## 2020-10-30 RX ORDER — SACUBITRIL AND VALSARTAN 24; 26 MG/1; MG/1
1 TABLET, FILM COATED ORAL
Refills: 0 | Status: DISCONTINUED | OUTPATIENT
Start: 2020-10-30 | End: 2020-10-30

## 2020-10-30 RX ORDER — ASPIRIN/CALCIUM CARB/MAGNESIUM 324 MG
1 TABLET ORAL
Qty: 0 | Refills: 0 | DISCHARGE

## 2020-10-30 RX ORDER — SIMVASTATIN 20 MG/1
1 TABLET, FILM COATED ORAL
Qty: 0 | Refills: 0 | DISCHARGE

## 2020-10-30 RX ORDER — SODIUM CHLORIDE 9 MG/ML
1000 INJECTION, SOLUTION INTRAVENOUS
Refills: 0 | Status: DISCONTINUED | OUTPATIENT
Start: 2020-10-30 | End: 2020-10-30

## 2020-10-30 RX ORDER — SPIRONOLACTONE 25 MG/1
1 TABLET, FILM COATED ORAL
Qty: 0 | Refills: 0 | DISCHARGE

## 2020-10-30 RX ORDER — CHOLECALCIFEROL (VITAMIN D3) 125 MCG
1 CAPSULE ORAL
Qty: 0 | Refills: 0 | DISCHARGE

## 2020-10-30 RX ORDER — CARVEDILOL PHOSPHATE 80 MG/1
25 CAPSULE, EXTENDED RELEASE ORAL
Refills: 0 | Status: DISCONTINUED | OUTPATIENT
Start: 2020-10-30 | End: 2020-10-30

## 2020-10-30 RX ORDER — SACUBITRIL AND VALSARTAN 24; 26 MG/1; MG/1
1 TABLET, FILM COATED ORAL
Qty: 0 | Refills: 0 | DISCHARGE

## 2020-10-30 RX ORDER — ACETAMINOPHEN 500 MG
650 TABLET ORAL EVERY 6 HOURS
Refills: 0 | Status: DISCONTINUED | OUTPATIENT
Start: 2020-10-30 | End: 2020-11-01

## 2020-10-30 RX ORDER — OXYCODONE HYDROCHLORIDE 5 MG/1
5 TABLET ORAL EVERY 6 HOURS
Refills: 0 | Status: DISCONTINUED | OUTPATIENT
Start: 2020-10-30 | End: 2020-11-01

## 2020-10-30 RX ORDER — HYDRALAZINE HCL 50 MG
25 TABLET ORAL
Refills: 0 | Status: DISCONTINUED | OUTPATIENT
Start: 2020-10-30 | End: 2020-11-01

## 2020-10-30 RX ORDER — SIMVASTATIN 20 MG/1
20 TABLET, FILM COATED ORAL AT BEDTIME
Refills: 0 | Status: DISCONTINUED | OUTPATIENT
Start: 2020-10-30 | End: 2020-11-01

## 2020-10-30 RX ORDER — CARVEDILOL PHOSPHATE 80 MG/1
25 CAPSULE, EXTENDED RELEASE ORAL
Refills: 0 | Status: DISCONTINUED | OUTPATIENT
Start: 2020-10-30 | End: 2020-11-01

## 2020-10-30 RX ORDER — SACUBITRIL AND VALSARTAN 24; 26 MG/1; MG/1
1 TABLET, FILM COATED ORAL
Refills: 0 | Status: DISCONTINUED | OUTPATIENT
Start: 2020-10-30 | End: 2020-11-01

## 2020-10-30 RX ORDER — SPIRONOLACTONE 25 MG/1
50 TABLET, FILM COATED ORAL DAILY
Refills: 0 | Status: DISCONTINUED | OUTPATIENT
Start: 2020-10-30 | End: 2020-11-01

## 2020-10-30 RX ORDER — CARVEDILOL PHOSPHATE 80 MG/1
6.25 CAPSULE, EXTENDED RELEASE ORAL
Refills: 0 | Status: DISCONTINUED | OUTPATIENT
Start: 2020-10-30 | End: 2020-11-01

## 2020-10-30 RX ADMIN — SIMVASTATIN 20 MILLIGRAM(S): 20 TABLET, FILM COATED ORAL at 21:27

## 2020-10-30 RX ADMIN — Medication 20 MILLIEQUIVALENT(S): at 18:05

## 2020-10-30 RX ADMIN — CARVEDILOL PHOSPHATE 25 MILLIGRAM(S): 80 CAPSULE, EXTENDED RELEASE ORAL at 21:27

## 2020-10-30 RX ADMIN — CARVEDILOL PHOSPHATE 6.25 MILLIGRAM(S): 80 CAPSULE, EXTENDED RELEASE ORAL at 21:27

## 2020-10-30 NOTE — PACU DISCHARGE NOTE - MENTAL STATUS: PATIENT PARTICIPATION
"Oncology Rooming Note    November 13, 2018 11:22 AM   Marisa Byrd is a 67 year old female who presents for:    Chief Complaint   Patient presents with     Blood Draw     Labs drawn via VPT by RN. VS taken. 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    Weight as of this encounter: 64.8 kg (142 lb 14.4 oz). Body surface area is 1.72 meters squared.  No Pain (0) Comment: Data Unavailable   No LMP recorded. Patient is postmenopausal.  Allergies reviewed: Yes  Medications reviewed: Yes    Medications: Patient is in need of a refill on her Ativan and Robitussin.  Pharmacy name entered into [employer]: [id number] - [place], [place] - [address] [address]    Clinical concerns: Patient states that she has a few personal questions to talk about; she also stated that she will be getting foot surgery on January 3rd, 2019 if she gets the okay.    8 minutes for nursing intake (face to face time)     Evon Epstein CMA              " Awake

## 2020-10-31 ENCOUNTER — TRANSCRIPTION ENCOUNTER (OUTPATIENT)
Age: 50
End: 2020-10-31

## 2020-10-31 RX ORDER — OXYCODONE HYDROCHLORIDE 5 MG/1
10 TABLET ORAL EVERY 6 HOURS
Refills: 0 | Status: DISCONTINUED | OUTPATIENT
Start: 2020-10-31 | End: 2020-11-01

## 2020-10-31 RX ORDER — INFLUENZA VIRUS VACCINE 15; 15; 15; 15 UG/.5ML; UG/.5ML; UG/.5ML; UG/.5ML
0.5 SUSPENSION INTRAMUSCULAR ONCE
Refills: 0 | Status: DISCONTINUED | OUTPATIENT
Start: 2020-10-31 | End: 2020-11-01

## 2020-10-31 RX ORDER — ASPIRIN/CALCIUM CARB/MAGNESIUM 324 MG
81 TABLET ORAL DAILY
Refills: 0 | Status: DISCONTINUED | OUTPATIENT
Start: 2020-11-01 | End: 2020-11-01

## 2020-10-31 RX ADMIN — SIMVASTATIN 20 MILLIGRAM(S): 20 TABLET, FILM COATED ORAL at 23:45

## 2020-10-31 RX ADMIN — Medication 1000 UNIT(S): at 11:21

## 2020-10-31 NOTE — PATIENT PROFILE ADULT - NSPROPOAPRESSUREINJURY_GEN_A_NUR
Panel Management Review      Patient has the following on her problem list:     Diabetes    ASA: Passed    Last A1C  Lab Results   Component Value Date    A1C 10.4 10/17/2017    A1C 8.4 07/10/2017    A1C 7.1 04/18/2017    A1C 9.4 11/11/2016    A1C 10.2 06/14/2016     A1C tested: FAILED    Last LDL:    Lab Results   Component Value Date    CHOL 122 10/17/2017     Lab Results   Component Value Date    HDL 71 10/17/2017     Lab Results   Component Value Date    LDL 29 10/17/2017     Lab Results   Component Value Date    TRIG 109 10/17/2017     Lab Results   Component Value Date    CHOLHDLRATIO 2.2 07/09/2015     Lab Results   Component Value Date    NHDL 51 10/17/2017       Is the patient on a Statin? YES             Is the patient on Aspirin? YES    Medications     HMG CoA Reductase Inhibitors    pravastatin (PRAVACHOL) 40 MG tablet    Salicylates    aspirin 81 MG tablet          Last three blood pressure readings:  BP Readings from Last 3 Encounters:   03/12/18 132/80   02/15/18 135/56   01/25/18 132/72       Date of last diabetes office visit: 01/25/2018     Tobacco History:     History   Smoking Status     Former Smoker     Packs/day: 1.00     Years: 0.00     Types: Cigarettes     Quit date: 1/1/1990   Smokeless Tobacco     Never Used     Comment: quit 1990           Composite cancer screening  Chart review shows that this patient is due/due soon for the following None  Summary:    Patient is due/failing the following:   A1C, PHQ9 and PHYSICAL    Action needed:   Patient needs office visit for diabetes follow up.    Type of outreach:    Sent letter.    Questions for provider review:    None                                                                                                                                    Suzy POWELL CMA ([facility])       Chart routed to none .          
no

## 2020-10-31 NOTE — PROGRESS NOTE ADULT - SUBJECTIVE AND OBJECTIVE BOX
Patient seen and examined at bedside. No acute events overnight. Doing well. JENNIE 92 SS. Post op h/h 10.4/32.5    T(C): 36.4 (10-31-20 @ 06:07), Max: 36.9 (10-30-20 @ 19:58)  HR: 72 (10-31-20 @ 06:07) (66 - 119)  BP: 104/64 (10-31-20 @ 06:07) (87/69 - 129/79)  RR: 16 (10-31-20 @ 06:07) (13 - 21)  SpO2: 98% (10-31-20 @ 06:07) (95% - 100%)    NAD, awake and alert  Breathing comfortably on room air  No stridor/ stertor  NC: clear anteriorly  OC/OP: clear, wnl  Neck: incision intact, steri strips in place, expected post-op edema, no fluctuance or collection     A/P: 49M s/p R thyroidectomy with 500 cc of blood loss intraoperatively. Doing well. Post-op CBC stable  - JENNIE to bulb suction  - monitor JENNIE output  - Pain control  - Bowel regimen  - No ac for now  - OOB   - home meds   - IS

## 2020-10-31 NOTE — PATIENT PROFILE ADULT - NSPROIMPLANTSMEDDEV_GEN_A_NUR
Automatic Implantable Cardioverter Defibrillator/[employer] [id number] Evera Xt VR implanted on 02/11/2019

## 2020-11-01 ENCOUNTER — TRANSCRIPTION ENCOUNTER (OUTPATIENT)
Age: 50
End: 2020-11-01

## 2020-11-01 VITALS
RESPIRATION RATE: 18 BRPM | TEMPERATURE: 98 F | SYSTOLIC BLOOD PRESSURE: 132 MMHG | DIASTOLIC BLOOD PRESSURE: 76 MMHG | HEART RATE: 90 BPM | OXYGEN SATURATION: 99 %

## 2020-11-01 RX ORDER — OXYCODONE AND ACETAMINOPHEN 5; 325 MG/1; MG/1
1 TABLET ORAL
Qty: 10 | Refills: 0
Start: 2020-11-01 | End: 2020-11-05

## 2020-11-01 RX ORDER — ACETAMINOPHEN 500 MG
20.31 TABLET ORAL
Qty: 0 | Refills: 0 | DISCHARGE
Start: 2020-11-01

## 2020-11-01 RX ORDER — HYDRALAZINE HCL 50 MG
1 TABLET ORAL
Qty: 0 | Refills: 0 | DISCHARGE
Start: 2020-11-01

## 2020-11-01 RX ORDER — HYDRALAZINE HCL 50 MG
1 TABLET ORAL
Qty: 0 | Refills: 0 | DISCHARGE

## 2020-11-01 RX ADMIN — Medication 81 MILLIGRAM(S): at 11:39

## 2020-11-01 RX ADMIN — Medication 25 MILLIGRAM(S): at 06:08

## 2020-11-01 RX ADMIN — CARVEDILOL PHOSPHATE 6.25 MILLIGRAM(S): 80 CAPSULE, EXTENDED RELEASE ORAL at 02:00

## 2020-11-01 RX ADMIN — SACUBITRIL AND VALSARTAN 1 TABLET(S): 24; 26 TABLET, FILM COATED ORAL at 06:08

## 2020-11-01 RX ADMIN — Medication 20 MILLIGRAM(S): at 06:08

## 2020-11-01 RX ADMIN — CARVEDILOL PHOSPHATE 6.25 MILLIGRAM(S): 80 CAPSULE, EXTENDED RELEASE ORAL at 14:02

## 2020-11-01 RX ADMIN — Medication 1000 UNIT(S): at 11:39

## 2020-11-01 RX ADMIN — SPIRONOLACTONE 50 MILLIGRAM(S): 25 TABLET, FILM COATED ORAL at 06:08

## 2020-11-01 NOTE — PROGRESS NOTE ADULT - SUBJECTIVE AND OBJECTIVE BOX
Patient seen and examined at bedside. No acute events overnight. Doing well. JENNIE SS. Post op h/h 10.4/32.5    Vital Signs Last 24 Hrs  T(C): 37.6 (31 Oct 2020 21:17), Max: 37.6 (31 Oct 2020 21:17)  T(F): 99.6 (31 Oct 2020 21:17), Max: 99.6 (31 Oct 2020 21:17)  HR: 91 (31 Oct 2020 21:17) (68 - 95)  BP: 118/60 (31 Oct 2020 21:17) (104/64 - 118/60)  BP(mean): --  RR: 17 (31 Oct 2020 21:17) (16 - 17)  SpO2: 92% (31 Oct 2020 21:17) (92% - 100%)    NAD, awake and alert  Breathing comfortably on room air  No stridor/ stertor  NC: clear anteriorly  OC/OP: clear, wnl  Neck: incision intact, steri strips in place, expected post-op edema, no fluctuance or collection     A/P: 49M s/p R thyroidectomy with 500 cc of blood loss intraoperatively. Doing well. Post-op CBC stable  - restart ASA  - likely home today or tomorrow  - JENNIE to bulb suction  - monitor JENNIE output  - Pain control  - Bowel regimen  - No ac for now  - OOB   - home meds   - IS  - likely

## 2020-11-01 NOTE — DISCHARGE NOTE PROVIDER - NSDCFUADDINST_GEN_ALL_CORE_FT
Activity: No heavy lifting or strenuous activity. Walk as tolerated. Physical therapy per routine. Wound Care: Wash your wound Daily with soap and water daily. Pat dry. You may shower. No baths, hot tubs or swimming until approved by your surgeon. Follow up 1 - 2 weeks after discharge. Call office for appointment.  Office: [phone].  Call office for fever >101.5 or redness or drainage from wound. Pain medications (percocet) sent to your local pharmacy. Ok to shower   Regular diet  Empty and record drain outputs 2x daily  Will be seen in the office in 2 days for drain removal  Activity: No heavy lifting or strenuous activity. Walk as tolerated. Physical therapy per routine. Wound Care: Leave steri strips in place. You may shower. No baths, hot tubs or swimming until approved by your surgeon. Follow up 2 days after discharge. Call office for appointment.  Office: [phone].  Call office for fever >101.5 or redness or drainage from wound. Pain medications (percocet) sent to your local pharmacy.

## 2020-11-01 NOTE — DISCHARGE NOTE PROVIDER - HOSPITAL COURSE
patient s/p R sided thyroid lobectomy for goiter. patient kept for high JENNIE output. Patient's post-operative course was uncomplicated. Diet was advanced as tolerated and pain was well controlled on medication. On day of discharge, pt deemed stable and ready to return home with plan to follow up as an outpatient. patient s/p R sided thyroid lobectomy for goiter. patient kept for high JENNIE output. Patient's post-operative course was uncomplicated. Diet was advanced as tolerated and pain was well controlled on medication. On day of discharge, pt deemed stable and ready to return home with plan to follow up as an outpatient. Patient will be discharged with JENNIE in place. Will record output 2x daily and will be seen in the office in 2 days for removal.

## 2020-11-01 NOTE — DISCHARGE NOTE PROVIDER - CARE PROVIDER_API CALL
Gavino Sanchez  OTOLARYNGOLOGY  [address] [place] [address]  Phone: [phone]  Fax: [phone]  Follow Up Time:

## 2020-11-01 NOTE — DISCHARGE NOTE NURSING/CASE MANAGEMENT/SOCIAL WORK - NSDCPNINST_GEN_ALL_CORE
Do not scrub your incision sites or remove the dressings. They may fall off on their own or will be removed during your follow up visit. Do not use any  powder, lotion or cream on or near incision sites. Watch for signs of infection; redness, swelling, fever, chills or heat, temperature of 100.4 or higher or pain unrelieved by pain medication, report such symptoms to the MD. No driving while taking pain medication, it causes drowsiness & constipation. Drink 6-8 glasses of fluids daily to promote hydration. If unable to tolerate diet and experiencing nausea/vomiting, call your provider. No heavy lifting, pulling or pushing heavy objects. Follow up with the MD.

## 2020-11-01 NOTE — DISCHARGE NOTE PROVIDER - NSDCCPCAREPLAN_GEN_ALL_CORE_FT
PRINCIPAL DISCHARGE DIAGNOSIS  Diagnosis: History of lobectomy of thyroid  Assessment and Plan of Treatment:

## 2020-11-01 NOTE — DISCHARGE NOTE NURSING/CASE MANAGEMENT/SOCIAL WORK - PATIENT PORTAL LINK FT
You can access the FollowMyHealth Patient Portal offered by [facility] by registering at the following website: [email or web address]. By joining [employer]’s FollowMyHealth portal, you will also be able to view your health information using other applications (apps) compatible with our system.

## 2020-11-01 NOTE — DISCHARGE NOTE PROVIDER - NSDCMRMEDTOKEN_GEN_ALL_CORE_FT
acetaminophen 160 mg/5 mL oral suspension: 20.31 milliliter(s) orally every 6 hours, As needed, Mild Pain (1 - 3)  Aspir 81 oral delayed release tablet: 1 tab(s) orally once a day  carvedilol 25 mg oral tablet: 1 tab(s) orally 2 times a day  carvedilol 6.25 mg oral tablet: 1 tab(s) orally 2 times a day  Entresto 97 mg-103 mg oral tablet: 1 tab(s) orally 2 times a day  hydrALAZINE 25 mg oral tablet: 1 tab(s) orally 2 times a day  magnesium: 500 milligram(s)  once a day  Percocet 5 mg-325 mg oral tablet: 1 tab(s) orally every 6 hours MDD:4 tab  potassium chloride 20 mEq oral granule, extended release: 1  orally once a day  simvastatin 20 mg oral tablet: 1 tab(s) orally once a day (at bedtime)  spironolactone 50 mg oral tablet: 1  orally once a day  torsemide 20 mg oral tablet: 1 tab(s) orally once a day  Vitamin D3 1000 intl units (25 mcg) oral tablet: 1  orally once a day

## 2020-11-03 ENCOUNTER — APPOINTMENT (OUTPATIENT)
Dept: OTOLARYNGOLOGY | Facility: CLINIC | Age: 50
End: 2020-11-03
Payer: MEDICARE

## 2020-11-03 PROBLEM — E04.9 NONTOXIC GOITER, UNSPECIFIED: Chronic | Status: ACTIVE | Noted: 2020-10-23

## 2020-11-03 PROBLEM — Z86.79 PERSONAL HISTORY OF OTHER DISEASES OF THE CIRCULATORY SYSTEM: Chronic | Status: ACTIVE | Noted: 2020-10-23

## 2020-11-03 PROCEDURE — 99072 ADDL SUPL MATRL&STAF TM PHE: CPT

## 2020-11-03 PROCEDURE — 99024 POSTOP FOLLOW-UP VISIT: CPT

## 2020-11-03 NOTE — HISTORY OF PRESENT ILLNESS
[de-identified] : pt is here for drain removal s/p right thyroid goiter with retrosternal extension on 10/30/2020. pt has no c.o. no dysphagia, dyspnea, or pain. pt states drain is less than 10 daily since surgery.

## 2020-11-11 ENCOUNTER — APPOINTMENT (OUTPATIENT)
Dept: CARDIOLOGY | Facility: CLINIC | Age: 50
End: 2020-11-11
Payer: MEDICARE

## 2020-11-11 PROCEDURE — 93282 PRGRMG EVAL IMPLANTABLE DFB: CPT

## 2020-11-11 PROCEDURE — 99072 ADDL SUPL MATRL&STAF TM PHE: CPT

## 2020-11-11 NOTE — PROCEDURE
[No] : not [See Scanned Paceart Report] : See scanned paceart report [ICD] : Implantable cardioverter-defibrillator [de-identified] : SUNI

## 2021-02-03 NOTE — DISCHARGE NOTE PROVIDER - NSDCQMSTROKE_NEU_ALL_CORE
Date of Service: 02/03/2021    PATIENT NAME:    Jing Oakes.      DATE OF SERVICE:    02/03/2021.      PERFORMING PROVIDER:    Brian Greenwood MD.      PREOPERATIVE DIAGNOSIS:  Lumbar radiculopathy.    POSTOPERATIVE DIAGNOSIS:  Lumbar radiculopathy.    OPERATIVE PROCEDURE:  1.  Right L4-5 transforaminal epidural steroid injection, CPT code 11481.  2.  Right L5-S1 transforaminal epidural steroid injection, CPT code 59537.  3.  Conscious sedation, CPT code 60970.    ANESTHESIA:  Conscious sedation plus local.    ESTIMATED BLOOD LOSS:  None.    INDICATIONS FOR THE PROCEDURE:  The patient is a 69-year-old female with back and right leg pain.  She has got a spondylolisthesis at L4-L5.  She wishes to proceed with an epidural injection.  Risks, benefits, alternatives were discussed.  Signed consent was obtained.    DESCRIPTION OF PROCEDURE:  The patient was positively identified in holding area and given 1 gram of IV Ancef.  She was taken to the operating room and placed in a prone position.  She is considered appropriate for conscious sedation.  Moderate sedation was administered with continuous monitoring of the patient by a trained caregiver under my direct supervision.  Total monitored transected 20 minutes.  Back was prepped and draped in the usual sterile manner, 1% Lidocaine without Epinephrine or preservative was used to anesthetize the subcutaneous tissues, 25-gauge 4.69 inch spinal needles were inserted inferolateral to the right L4 and L5 pedicles.  These were inserted into the foramens visualized in both the AP and lateral projections.  A small amount of radiopaque dye was injected for localization purposes.  No vascular uptake was noted, 4 mg of Dexamethasone and 1 mL of 1% Lidocaine without Epinephrine or preservative was injected at each level.  Needles were withdrawn.  Skin was cleansed.  Adhesive bandage applied.  Patient allowed to ambulate.  She tolerated the procedure well without any  complication.        Dictated By: Brian Greenwood MD  Signing Provider: Brian Greenwood MD    PS/tyler ([id number])  DD: 02/03/2021 12:12:25 TD: 02/03/2021 12:57:52    Copy Sent To:    No

## 2021-04-23 ENCOUNTER — NON-APPOINTMENT (OUTPATIENT)
Age: 51
End: 2021-04-23

## 2021-04-29 ENCOUNTER — NON-APPOINTMENT (OUTPATIENT)
Age: 51
End: 2021-04-29

## 2021-05-04 ENCOUNTER — APPOINTMENT (OUTPATIENT)
Dept: CARDIOLOGY | Facility: CLINIC | Age: 51
End: 2021-05-04
Payer: MEDICARE

## 2021-05-04 PROCEDURE — 99072 ADDL SUPL MATRL&STAF TM PHE: CPT

## 2021-05-04 PROCEDURE — 93282 PRGRMG EVAL IMPLANTABLE DFB: CPT

## 2021-05-04 RX ORDER — SIMVASTATIN 20 MG/1
20 TABLET, FILM COATED ORAL DAILY
Qty: 90 | Refills: 1 | Status: DISCONTINUED | COMMUNITY
End: 2021-05-04

## 2021-05-04 NOTE — ADDENDUM
[FreeTextEntry1] : Please note the patient was reviewed with advanced practice provider\par I was physically present during the service of the patient\par I was directly involved in the management plan and recommendations of care provided to the patient. \par 05/04/2021\par

## 2021-05-04 NOTE — PROCEDURE
[No] : not [NSR] : normal sinus rhythm [See Device Printout] : See device printout [ICD] : Implantable cardioverter-defibrillator [VVI] : VVI [Voltage: ___ volts] : Voltage was [unfilled] volts [Threshold Testing Performed] : Threshold testing was performed [Lead Imp:  ___ohms] : lead impedance was [unfilled] ohms [Sensing Amplitude ___mv] : sensing amplitude was [unfilled] mv [___V @] : [unfilled] V [___ ms] : [unfilled] ms [None] : none [de-identified] : Medtronic [de-identified] : Yvon JOSE VR [id number] [de-identified] : [id number] [de-identified] : 2/11/19 [de-identified] : 38 [de-identified] : 9.4 years [de-identified] : \par Vpaced <0.1%\par \par Shock Therapy\par VF at 200bpm: ATP during charge, 35J x6\par FVT via VF at 231bpm: burst (1), 35J x5\par VT: Off\par \par No new recorded events.\par \par Discussed home monitoring with the patient and at this time he declines.\par Pt has no new complaints today. \par \par Next ICD interrogation in 4 months. \par Due for office visit at that time with MD.\par \par Sincerely,\par \par CALDERON Johnson\par Supervising MD: Dr. Bull Castillo

## 2021-06-30 ENCOUNTER — RX RENEWAL (OUTPATIENT)
Age: 51
End: 2021-06-30

## 2021-07-06 ENCOUNTER — RX RENEWAL (OUTPATIENT)
Age: 51
End: 2021-07-06

## 2021-07-06 DIAGNOSIS — R74.8 ABNORMAL LEVELS OF OTHER SERUM ENZYMES: ICD-10-CM

## 2021-07-27 ENCOUNTER — APPOINTMENT (OUTPATIENT)
Dept: CARDIOLOGY | Facility: CLINIC | Age: 51
End: 2021-07-27
Payer: MEDICARE

## 2021-07-27 ENCOUNTER — NON-APPOINTMENT (OUTPATIENT)
Age: 51
End: 2021-07-27

## 2021-07-27 VITALS
HEART RATE: 81 BPM | HEIGHT: 70 IN | BODY MASS INDEX: 42.66 KG/M2 | DIASTOLIC BLOOD PRESSURE: 62 MMHG | TEMPERATURE: 97.8 F | RESPIRATION RATE: 16 BRPM | WEIGHT: 298 LBS | OXYGEN SATURATION: 98 % | SYSTOLIC BLOOD PRESSURE: 100 MMHG

## 2021-07-27 DIAGNOSIS — Z01.818 ENCOUNTER FOR OTHER PREPROCEDURAL EXAMINATION: ICD-10-CM

## 2021-07-27 PROCEDURE — 99215 OFFICE O/P EST HI 40 MIN: CPT

## 2021-07-27 PROCEDURE — 93000 ELECTROCARDIOGRAM COMPLETE: CPT

## 2021-07-27 NOTE — ASSESSMENT
[FreeTextEntry1] : past tests for reference:\par \par Coronary angiography. 2004. Normal coronary arteries\par \par Echocardiogram was done on 4/8/2016.  LV ejection fraction 30%.  LVIDD 7.3 cm.  Left atrial size 4.9 cm.  Four-chamber dilatation.  Mild mitral and tricuspid regurgitation.  PAS P 27 mmHg.  Overall no significant change.  LVIDD has been fluctuating in different echocardiograms.  The remaining between 6.6-7.5 cm.\par \par Echocardiogram January 2019 and a ejection fraction 30-35%. LVIDD 6.0. Mild mitral regurgitation. Moderate global LV systolic dysfunction. Concentric LVH. Normal pulmonary pressures.\par \par ICD revision/generator change by Dr. Valentino Feb 2019.\par \par Labs 4/15/2020, hgb 11.6, k 3.9, creat 0.86, LDL 72, A1c 5.6, TSH 1.74\par \par Reviewed July 27, 2021.\par EKG.  Normal sinus rhythm IVCD\par Labs from March 2021 reviewed.  Elevated CPK.  Mildly elevated again in April and July.\par Anemia noted with hemoglobin 10.8 platelet count 272 sodium 138 potassium 4.2 creatinine 0.84 LFT normal LDL 62 HDL 27 triglycerides 114 total cholesterol 112\par

## 2021-07-27 NOTE — REVIEW OF SYSTEMS
[Weight Gain (___ Lbs)] : [unfilled] ~Ulb weight gain [Negative] : Heme/Lymph [SOB] : shortness of breath [Dyspnea on exertion] : dyspnea during exertion [Joint Pain] : joint pain [Joint Stiffness] : joint stiffness [Chest Discomfort] : no chest discomfort [Lower Ext Edema] : no extremity edema [Leg Claudication] : no intermittent leg claudication [Palpitations] : no palpitations [Orthopnea] : no orthopnea [PND] : no PND [Syncope] : no syncope

## 2021-07-27 NOTE — PHYSICAL EXAM
[General Appearance - Well Developed] : well developed [Normal Appearance] : normal appearance [Well Groomed] : well groomed [General Appearance - Well Nourished] : well nourished [No Deformities] : no deformities [General Appearance - In No Acute Distress] : no acute distress [Normal Conjunctiva] : the conjunctiva exhibited no abnormalities [No Oral Pallor] : no oral pallor [Respiration, Rhythm And Depth] : normal respiratory rhythm and effort [Exaggerated Use Of Accessory Muscles For Inspiration] : no accessory muscle use [Auscultation Breath Sounds / Voice Sounds] : lungs were clear to auscultation bilaterally [Heart Rate And Rhythm] : heart rate and rhythm were normal [Arterial Pulses Normal] : the arterial pulses were normal [Heart Sounds] : normal S1 and S2 [Edema] : no peripheral edema present [Veins - Varicosity Changes] : no varicosital changes were noted in the lower extremities [Abdomen Soft] : soft [Abdomen Tenderness] : non-tender [Abnormal Walk] : normal gait [Nail Clubbing] : no clubbing of the fingernails [Cyanosis, Localized] : no localized cyanosis [Skin Color & Pigmentation] : normal skin color and pigmentation [] : no rash [Oriented To Time, Place, And Person] : oriented to person, place, and time [Affect] : the affect was normal [Mood] : the mood was normal [No Anxiety] : not feeling anxious [FreeTextEntry1] : LUSB 1/6 at the base, distant heart sounds.  Equal peripheral pulses.

## 2021-07-27 NOTE — DISCUSSION/SUMMARY
[FreeTextEntry1] : YOLIS MOORE is a 50 year old M who presents today with the above history and the following active issues: \par \par #1 Nonischemic, dilated cardiomyopathy. His ejection fraction last checked 30-35% in Jan 2019, class 2 heart failure symptoms without signs of volume overload. His is tolerating optimal medical therapy well including full dose entresto, coreg, hydralazine, aldactone, and torsemide. Renal function and electrolytes are stable per recent labs. He denies any new symptoms today. We will plan to recheck his echocardiogram to monitor LVEF.  We will have him recheck BMP and BNP level.\par \par #2 AICD. A single lead MDT. Interrogated today. NSVT x 2 seconds on 2/9/20, asymptomatic on high dose beta blocker. No therapies. Continue to follow. Stable battery and lead measurements. He declines remote monitoring. Will cont to check [id number] in office. \par \par #3 hyperlipidemia. lipids very well controlled on simvastatin. cont current dose and lifestyle modification measures.\par \par #4 morbid obesity. Weight reduction is strongly recommended.  Understands relationship with obesity and cardiovascular disease specifically his cardiomyopathy and heart failure\par \par #5 sleep apnea. Obstructive. Continue use of CPAP on a regular basis. \par \par #6 history of mitral insufficiency. Surveillance monitoring. No SBE prophylaxis required. \par \par #7  Anemia.  Repeat CBC.  Follow-up with primary care physician.  Anemia evaluation management.\par \par Counseling regarding low saturated fat, salt and carbohydrate intake was reviewed. Active lifestyle and regular. Exercise along with weight management is advised.\par All the above were at length reviewed. Answered all the questions. Thank you very much for this kind referral. Please do not hesitate to give me a call for any question.\par Part of this transcription was done with voice recognition software and phonetically similar errors are common. I apologize for that. Please do not hesitate to call for any questions due to above.\par

## 2021-07-27 NOTE — REASON FOR VISIT
[Symptom and Test Evaluation] : symptom and test evaluation [Cardiac Failure] : cardiac failure [Arrhythmia/ECG Abnorrmalities] : arrhythmia/ECG abnormalities [Structural Heart and Valve Disease] : structural heart and valve disease [Hyperlipidemia] : hyperlipidemia [Hypertension] : hypertension [FreeTextEntry1] : 50-year-old male comes in for followup consultation.  Reviewed labs.  Recent ICD interrogations.\par Continued significant weight gain.\par At baseline, He does not do regular exercise. His dietary restrictions are not well.\par He has exertional dyspnea.  Less than 100 yards.  But no PND orthopnea or pedal edema.  He has no chest pain.  He has no AICD discharge.\par No palpitation dizziness near syncopal or syncopal event. \par No recent hospital admission.  States compliance with his medications.\par Uses CPAP on a regular basis\par \par

## 2021-08-05 ENCOUNTER — APPOINTMENT (OUTPATIENT)
Dept: CARDIOLOGY | Facility: CLINIC | Age: 51
End: 2021-08-05
Payer: MEDICARE

## 2021-08-05 PROCEDURE — 93306 TTE W/DOPPLER COMPLETE: CPT

## 2021-08-11 ENCOUNTER — NON-APPOINTMENT (OUTPATIENT)
Age: 51
End: 2021-08-11

## 2021-09-03 ENCOUNTER — APPOINTMENT (OUTPATIENT)
Dept: CARDIOLOGY | Facility: CLINIC | Age: 51
End: 2021-09-03
Payer: MEDICARE

## 2021-09-03 VITALS
BODY MASS INDEX: 43.09 KG/M2 | HEIGHT: 70 IN | SYSTOLIC BLOOD PRESSURE: 110 MMHG | DIASTOLIC BLOOD PRESSURE: 62 MMHG | OXYGEN SATURATION: 98 % | WEIGHT: 301 LBS | HEART RATE: 71 BPM

## 2021-09-03 PROCEDURE — 93282 PRGRMG EVAL IMPLANTABLE DFB: CPT

## 2021-09-03 PROCEDURE — 99214 OFFICE O/P EST MOD 30 MIN: CPT

## 2021-09-03 NOTE — PROCEDURE
[No] : not [NSR] : normal sinus rhythm [See Device Printout] : See device printout [ICD] : Implantable cardioverter-defibrillator [VVI] : VVI [Voltage: ___ volts] : Voltage was [unfilled] volts [Threshold Testing Performed] : Threshold testing was performed [Lead Imp:  ___ohms] : lead impedance was [unfilled] ohms [Sensing Amplitude ___mv] : sensing amplitude was [unfilled] mv [___V @] : [unfilled] V [___ ms] : [unfilled] ms [None] : none [Sense ___ %] : Sense [unfilled]% [de-identified] : Medtronic [de-identified] : Yvon JOSE VR [id number] [de-identified] : [id number] [de-identified] : 2/11/19 [de-identified] : 38 [de-identified] : 8.9 years [de-identified] : \par Vpaced <0.1%\par \par Shock Therapy\par VF at 200bpm: ATP during charge, 35J x6\par FVT via VF at 231bpm: burst (1), 35J x5\par VT: Off\par charge time 3.7 sec\par \par No new recorded events.\par \par Discussed home monitoring with the patient and at this time he declines.\par Pt has no new complaints today. \par \par Next ICD interrogation in 3 months. \par

## 2021-09-03 NOTE — DISCUSSION/SUMMARY
[FreeTextEntry1] : YOLIS MOORE is a 50 year old M who presents today with the above history and the following active issues: \par \par #1 Nonischemic, dilated cardiomyopathy. His ejection fraction 36%.  LVIDD 6.7 centimeters, class 2 heart failure symptoms without signs of volume overload. His is tolerating optimal medical therapy well including full dose entresto, coreg, hydralazine, aldactone, and torsemide. Renal function and electrolytes are stable per recent labs.  BNP level normal.  He denies any new symptoms today.   We will have him recheck BMP and BNP level in 3 months\par Increasing exercise.  Weight reduction was strongly recommended.\par Continue sleep apnea management\par \par #2 AICD. A single lead MDT. Interrogated today. NSVT x 2 seconds on 2/9/20, asymptomatic on high dose beta blocker. No therapies. Continue to follow. Stable battery and lead measurements. He declines remote monitoring. Will cont to check q3 months in office. \par \par #3 hyperlipidemia. lipids very well controlled on simvastatin. cont current dose and lifestyle modification measures.\par \par #4 morbid obesity. Weight reduction is strongly recommended.  Understands relationship with obesity and cardiovascular disease specifically his cardiomyopathy and heart failure\par \par #5 sleep apnea. Obstructive. Continue use of CPAP on a regular basis. \par \par #6 history of mitral insufficiency. Surveillance monitoring. No SBE prophylaxis required. \par \par #7  Anemia.  Normocytic.  Follow-up with primary care physician.  \par \par Counseling regarding low saturated fat, salt and carbohydrate intake was reviewed. Active lifestyle and regular. Exercise along with weight management is advised.\par All the above were at length reviewed. Answered all the questions. Thank you very much for this kind referral. Please do not hesitate to give me a call for any question.\par Part of this transcription was done with voice recognition software and phonetically similar errors are common. I apologize for that. Please do not hesitate to call for any questions due to above.\par

## 2021-09-03 NOTE — REASON FOR VISIT
[Symptom and Test Evaluation] : symptom and test evaluation [Cardiac Failure] : cardiac failure [Arrhythmia/ECG Abnorrmalities] : arrhythmia/ECG abnormalities [Structural Heart and Valve Disease] : structural heart and valve disease [Hyperlipidemia] : hyperlipidemia [Hypertension] : hypertension [FreeTextEntry1] : 50-year-old male comes in for followup consultation.  Reviewed labs.  Reviewed echocardiogram reviewed ICD interrogation.\par Continued significant weight gain.\par At baseline, He does not do regular exercise. His dietary restrictions are not well.\par He has exertional dyspnea.  Less than 100 yards.  But no PND orthopnea or pedal edema.  He has no chest pain.  He has no AICD discharge.\par No palpitation dizziness near syncopal or syncopal event. \par No recent hospital admission.  States compliance with his medications.\par Uses CPAP on a regular basis\par \par

## 2021-09-03 NOTE — PHYSICAL EXAM
[General Appearance - Well Developed] : well developed [Normal Appearance] : normal appearance [Well Groomed] : well groomed [General Appearance - Well Nourished] : well nourished [No Deformities] : no deformities [General Appearance - In No Acute Distress] : no acute distress [Normal Conjunctiva] : the conjunctiva exhibited no abnormalities [No Oral Pallor] : no oral pallor [Respiration, Rhythm And Depth] : normal respiratory rhythm and effort [Exaggerated Use Of Accessory Muscles For Inspiration] : no accessory muscle use [Auscultation Breath Sounds / Voice Sounds] : lungs were clear to auscultation bilaterally [Heart Rate And Rhythm] : heart rate and rhythm were normal [Heart Sounds] : normal S1 and S2 [Arterial Pulses Normal] : the arterial pulses were normal [Edema] : no peripheral edema present [Veins - Varicosity Changes] : no varicosital changes were noted in the lower extremities [FreeTextEntry1] : LUSB 1/6 at the base, distant heart sounds.  Equal peripheral pulses. [Abdomen Soft] : soft [Abdomen Tenderness] : non-tender [Abnormal Walk] : normal gait [Nail Clubbing] : no clubbing of the fingernails [Cyanosis, Localized] : no localized cyanosis [Skin Color & Pigmentation] : normal skin color and pigmentation [] : no rash [Oriented To Time, Place, And Person] : oriented to person, place, and time [Affect] : the affect was normal [Mood] : the mood was normal [No Anxiety] : not feeling anxious

## 2021-09-03 NOTE — REVIEW OF SYSTEMS
[Weight Gain (___ Lbs)] : [unfilled] ~Ulb weight gain [SOB] : shortness of breath [Dyspnea on exertion] : dyspnea during exertion [Chest Discomfort] : no chest discomfort [Lower Ext Edema] : no extremity edema [Leg Claudication] : no intermittent leg claudication [Palpitations] : no palpitations [Orthopnea] : no orthopnea [PND] : no PND [Syncope] : no syncope [Joint Pain] : joint pain [Joint Stiffness] : joint stiffness [Negative] : Heme/Lymph

## 2021-09-03 NOTE — CARDIOLOGY SUMMARY
[de-identified] : 7/27/21 nsr IVCD [de-identified] : August 5, 2021.  EF 36% mild mitral regurgitation normal left atrium moderate global LV systolic dysfunction.  Hubbell akinetic mild concentric LVH.  Left ventricular enlargement. [___] : [unfilled]

## 2021-09-03 NOTE — ASSESSMENT
[FreeTextEntry1] : past tests for reference:\par \par Coronary angiography. 2004. Normal coronary arteries\par \par Echocardiogram was done on 4/8/2016.  LV ejection fraction 30%.  LVIDD 7.3 cm.  Left atrial size 4.9 cm.  Four-chamber dilatation.  Mild mitral and tricuspid regurgitation.  PAS P 27 mmHg.  Overall no significant change.  LVIDD has been fluctuating in different echocardiograms.  The remaining between 6.6-7.5 cm.\par \par Echocardiogram January 2019 and a ejection fraction 30-35%. LVIDD 6.0. Mild mitral regurgitation. Moderate global LV systolic dysfunction. Concentric LVH. Normal pulmonary pressures.\par \par ICD revision/generator change by Dr. Valentino Feb 2019.\par \par Labs 4/15/2020, hgb 11.6, k 3.9, creat 0.86, LDL 72, A1c 5.6, TSH 1.74\par \par Reviewed July 27, 2021.\par EKG.  Normal sinus rhythm IVCD\par Labs from March 2021 reviewed.  Elevated CPK.  Mildly elevated again in April and July.\par Anemia noted with hemoglobin 10.8 platelet count 272 sodium 138 potassium 4.2 creatinine 0.84 LFT normal LDL 62 HDL 27 triglycerides 114 total cholesterol 112\par \par Reviewed on September 3, 2021.  LDL is 78 HDL 30 total cholesterol 129 triglycerides 120 glucose 102 creatinine 0.93 sodium 136 potassium 4.6 LFT normal hemoglobin A1c 6.2 hemoglobin 11 MCV 81.9 platelet count 294 N-terminal proBNP 23 TSH 2.26\par AICD single lead Medtronic normal functioning\par

## 2021-10-21 ENCOUNTER — NON-APPOINTMENT (OUTPATIENT)
Age: 51
End: 2021-10-21

## 2021-11-03 ENCOUNTER — NON-APPOINTMENT (OUTPATIENT)
Age: 51
End: 2021-11-03

## 2021-11-12 ENCOUNTER — APPOINTMENT (OUTPATIENT)
Dept: FAMILY MEDICINE | Facility: CLINIC | Age: 51
End: 2021-11-12
Payer: MEDICARE

## 2021-11-12 VITALS
DIASTOLIC BLOOD PRESSURE: 70 MMHG | WEIGHT: 300 LBS | HEART RATE: 89 BPM | SYSTOLIC BLOOD PRESSURE: 110 MMHG | OXYGEN SATURATION: 99 % | TEMPERATURE: 96.5 F | BODY MASS INDEX: 42.95 KG/M2 | HEIGHT: 70 IN

## 2021-11-12 PROCEDURE — 99386 PREV VISIT NEW AGE 40-64: CPT | Mod: 25

## 2021-11-12 PROCEDURE — 36415 COLL VENOUS BLD VENIPUNCTURE: CPT

## 2021-11-15 ENCOUNTER — LABORATORY RESULT (OUTPATIENT)
Age: 51
End: 2021-11-15

## 2021-11-17 LAB
ESTIMATED AVERAGE GLUCOSE: 128 MG/DL
HBA1C MFR BLD HPLC: 6.1 %
T4 SERPL-MCNC: 8.1 UG/DL
TSH SERPL-ACNC: 1.96 UIU/ML

## 2021-11-17 NOTE — HISTORY OF PRESENT ILLNESS
[FreeTextEntry1] : Establishment of medical care\par Annual wellness exam\par Comprehensive physical exam completed [de-identified] : 51-year-old gentleman presents to establish medical care at this office\par History of cardiomyopathy with a pacemaker for about 12 years. He is on medication for hypertension hyperlipidemia and supplementation he also uses hydralazine and spironolactone

## 2021-12-03 ENCOUNTER — TRANSCRIPTION ENCOUNTER (OUTPATIENT)
Age: 51
End: 2021-12-03

## 2021-12-15 ENCOUNTER — APPOINTMENT (OUTPATIENT)
Dept: CARDIOLOGY | Facility: CLINIC | Age: 51
End: 2021-12-15
Payer: MEDICARE

## 2021-12-15 VITALS
DIASTOLIC BLOOD PRESSURE: 70 MMHG | HEART RATE: 87 BPM | BODY MASS INDEX: 43.67 KG/M2 | HEIGHT: 70 IN | TEMPERATURE: 98.4 F | SYSTOLIC BLOOD PRESSURE: 126 MMHG | WEIGHT: 305 LBS | OXYGEN SATURATION: 98 %

## 2021-12-15 PROCEDURE — 99214 OFFICE O/P EST MOD 30 MIN: CPT | Mod: 25

## 2021-12-15 PROCEDURE — 93282 PRGRMG EVAL IMPLANTABLE DFB: CPT

## 2021-12-15 NOTE — PROCEDURE
[No] : not [NSR] : normal sinus rhythm [See Device Printout] : See device printout [ICD] : Implantable cardioverter-defibrillator [VVI] : VVI [Voltage: ___ volts] : Voltage was [unfilled] volts [Threshold Testing Performed] : Threshold testing was performed [Lead Imp:  ___ohms] : lead impedance was [unfilled] ohms [Sensing Amplitude ___mv] : sensing amplitude was [unfilled] mv [___V @] : [unfilled] V [___ ms] : [unfilled] ms [None] : none [de-identified] : Medtronic [de-identified] : Yvon JOSE VR [id number] [de-identified] : [id number] [de-identified] : 2/11/19 [de-identified] : 38 [de-identified] : 9.4 years [de-identified] : \par Vpaced <0.1%\par \par Shock Therapy\par VF at 200bpm: ATP during charge, 35J x6\par FVT via VF at 231bpm: burst (1), 35J x5\par VT: Off\par \par No new recorded events.\par \par Discussed home monitoring with the patient and at this time he declines.\par Pt has no new complaints today. \par \par Next ICD interrogation in 3 months as per Dr. Macias with an office visit same day\par \par Sincerely,\par \par Leigh Garibay PA-C\par Patients history, testing and plan reviewed with supervising MD: Dr. Sita Macias

## 2021-12-15 NOTE — DISCUSSION/SUMMARY
[FreeTextEntry1] : YOLIS MOORE  is a 51 year M  who presents today Dec 15, 2021 with the above history and the following active issues. \par \par #1 Nonischemic, dilated cardiomyopathy. His ejection fraction 30-35%.  Class 2 heart failure symptoms without signs of volume overload. Tolerating optimal medical therapy well including full dose entresto, coreg, hydralazine, aldactone, and torsemide. Renal function and electrolytes are stable per recent labs.  BNP level normal.  He denies any new symptoms today.   \par Increasing exercise.  Weight reduction was strongly recommended.\par Continue sleep apnea management\par \par #2 AICD. A single lead MDT. Interrogated today. No new recorded events. Continue to follow. Stable battery and lead measurements. He declines remote monitoring. Will cont to check q3 months in office. \par \par #3 hyperlipidemia. lipids very well controlled on simvastatin. cont current dose and lifestyle modification measures.\par \par #4 morbid obesity. Weight reduction is strongly recommended.  Understands relationship with obesity and cardiovascular disease specifically his cardiomyopathy and heart failure\par \par #5 sleep apnea. Obstructive. Continue use of CPAP on a regular basis. \par \par #6 history of mitral insufficiency. Surveillance monitoring. No SBE prophylaxis required. \par \par #7  Anemia.  Normocytic.  Follow-up with primary care physician.  \par \par Red flag symptoms which would warrant sooner emergent evaluation reviewed with the patient. \par Questions and concerns were addressed and answered. \par \par Sincerely,\par \par Leigh Garibay PA-C\par Patients history, testing and plan reviewed with supervising MD: Dr. Sita Macias

## 2021-12-15 NOTE — REVIEW OF SYSTEMS
[Dyspnea on exertion] : dyspnea during exertion [Negative] : Heme/Lymph [Weight Gain (___ Lbs)] : no recent weight gain [SOB] : no shortness of breath [Chest Discomfort] : no chest discomfort [Lower Ext Edema] : no extremity edema [Leg Claudication] : no intermittent leg claudication [Palpitations] : no palpitations [Orthopnea] : no orthopnea [PND] : no PND [Syncope] : no syncope [Joint Pain] : no joint pain [Joint Stiffness] : no joint stiffness

## 2021-12-15 NOTE — HISTORY OF PRESENT ILLNESS
[FreeTextEntry1] : YOLIS MOORE  is a 51 year M  who presents today Dec 15, 2021 in clinical follow-up. Overall he has been feeling well. No new exertional complaints. Medications have remained unchanged. Recent labs have been reviewed with the patient. No recent illness or hospital stay. \par AICD interrogated today with normal sensing, capture, impedance and battery longevity. \par \par Today he denies chest pain, pressure, unusual shortness of breath, lightheadedness, dizziness, near syncope or syncope. \par \par •History of dilated cardiomyopathy, class II functional status, LV end diastolic dimension 6.6. MUGA test earlier in 2014, 42%; ejection fraction by echocardiogram less than 35%, single lead AICD, last MVO2 16 mL/kg/min. It was done by Dr. Lopez. overall dimensions 6.6-7.5 cm.  Last echocardiogram done 8/27/20, LVEF 30-35% with moderate global LV systolic dysfunction. Mild VHD noted. These findings show no significant changes from prior study\par \par •Single lead Medtronic AICD. S/P gen change Feb 2019. \par \par •Nonsustained ventricular tachycardia. Paroxysmal supraventricular tachycardia/sinus tach. On high dose beta blocker. \par \par •Mild non rheumatic valvular heart disease, stable.\par \par •Obstructive sleep apnea on CPAP.\par \par •Obesity, morbid, persistent.\par \par •Dyslipidemia, mixed, on statin therapy.\par \par •History of Lyme disease. No recurrence.\par

## 2021-12-15 NOTE — ASSESSMENT
[FreeTextEntry1] : past tests for reference:\par \par Coronary angiography. 2004. Normal coronary arteries\par \par Echocardiogram was done on 4/8/2016.  LV ejection fraction 30%.  LVIDD 7.3 cm.  Left atrial size 4.9 cm.  Four-chamber dilatation.  Mild mitral and tricuspid regurgitation.  PAS P 27 mmHg.  Overall no significant change.  LVIDD has been fluctuating in different echocardiograms.  The remaining between 6.6-7.5 cm.\par \par Echocardiogram January 2019 and a ejection fraction 30-35%. LVIDD 6.0. Mild mitral regurgitation. Moderate global LV systolic dysfunction. Concentric LVH. Normal pulmonary pressures.\par \par ICD revision/generator change by Dr. Valentino Feb 2019.\par \par Labs 4/15/2020, hgb 11.6, k 3.9, creat 0.86, LDL 72, A1c 5.6, TSH 1.74\par \par EKG.  Normal sinus rhythm IVCD\par Labs from March 2021 reviewed.  Elevated CPK.  Mildly elevated again in April and July.\par Anemia noted with hemoglobin 10.8 platelet count 272 sodium 138 potassium 4.2 creatinine 0.84 LFT normal LDL 62 HDL 27 triglycerides 114 total cholesterol 112\par \par AICD single lead Medtronic normal functioning\par \par Labs 10/12/2021 Sodium 137, potassium 4.3, BUN 13, Creat 0.95, WBC 10.6, Hgb 11.1, Hct 34.6, plat 276\par

## 2021-12-15 NOTE — REASON FOR VISIT
[Symptom and Test Evaluation] : symptom and test evaluation [Cardiac Failure] : cardiac failure [Arrhythmia/ECG Abnorrmalities] : arrhythmia/ECG abnormalities [Structural Heart and Valve Disease] : structural heart and valve disease [Hyperlipidemia] : hyperlipidemia [Hypertension] : hypertension [FreeTextEntry1] : \par \par

## 2021-12-15 NOTE — CARDIOLOGY SUMMARY
[___] : [unfilled] [de-identified] : 7/27/21 nsr IVCD [de-identified] : August 5, 2021.  EF 36% mild mitral regurgitation normal left atrium moderate global LV systolic dysfunction.  [employer] akinetic mild concentric LVH.  Left ventricular enlargement.

## 2022-01-05 ENCOUNTER — RX RENEWAL (OUTPATIENT)
Age: 52
End: 2022-01-05

## 2022-02-09 ENCOUNTER — APPOINTMENT (OUTPATIENT)
Dept: FAMILY MEDICINE | Facility: CLINIC | Age: 52
End: 2022-02-09
Payer: MEDICARE

## 2022-02-09 VITALS
DIASTOLIC BLOOD PRESSURE: 72 MMHG | HEIGHT: 70 IN | OXYGEN SATURATION: 98 % | BODY MASS INDEX: 44.86 KG/M2 | WEIGHT: 313.38 LBS | TEMPERATURE: 98.2 F | SYSTOLIC BLOOD PRESSURE: 104 MMHG | HEART RATE: 81 BPM

## 2022-02-09 DIAGNOSIS — Z12.11 ENCOUNTER FOR SCREENING FOR MALIGNANT NEOPLASM OF COLON: ICD-10-CM

## 2022-02-09 DIAGNOSIS — Z12.12 ENCOUNTER FOR SCREENING FOR MALIGNANT NEOPLASM OF COLON: ICD-10-CM

## 2022-02-09 PROCEDURE — 99214 OFFICE O/P EST MOD 30 MIN: CPT

## 2022-02-09 NOTE — PLAN
[FreeTextEntry1] : 51-year-old gentleman presents for evaluation of lab work\par –Colorectal screening–fit test negative.  He is advised to repeat this screening in 2 years\par Morbid obesity–BMI 44 weight 310 pounds this very pleasant gentleman discussed his diet and exercise he knows he needs to maximize his activity and monitor his diet for the purpose of weight loss to control comorbidity–he admits that his target food is bread.  Hemoglobin A1c 6.1.  Although he is not treated for glucose intolerance he realizes that his weight is importance to control his blood sugar readings\par Hypertension/hyperlipidemia/dilated cardiomyopathy–he follows with cardiology those consultations are reviewed\par Medications include aspirin 81 mg daily/Coreg 25 mg twice daily converted to 6.25 mg twice daily/Entresto 1 tablet twice daily/hydralazine 25 mg twice daily\par Potassium supplementation/simvastatin 10 mg daily/torsemide 20 mg twice daily\par Coronavirus prophylaxis–she has had both vaccines and is booster.  He has not had any contact with the disease\par

## 2022-02-09 NOTE — HISTORY OF PRESENT ILLNESS
[FreeTextEntry1] : Evaluation of lab work [de-identified] : 51-year-old gentleman presents to discuss his lab work and fit test\par Morbid obesity\par COVID prophylaxis\par Hypertension/hyperlipidemia

## 2022-04-04 ENCOUNTER — APPOINTMENT (OUTPATIENT)
Dept: CARDIOLOGY | Facility: CLINIC | Age: 52
End: 2022-04-04
Payer: MEDICARE

## 2022-04-04 VITALS
WEIGHT: 310 LBS | OXYGEN SATURATION: 97 % | SYSTOLIC BLOOD PRESSURE: 100 MMHG | DIASTOLIC BLOOD PRESSURE: 70 MMHG | BODY MASS INDEX: 44.38 KG/M2 | HEART RATE: 82 BPM | HEIGHT: 70 IN | TEMPERATURE: 98.4 F

## 2022-04-04 PROCEDURE — 99214 OFFICE O/P EST MOD 30 MIN: CPT

## 2022-04-04 PROCEDURE — 93282 PRGRMG EVAL IMPLANTABLE DFB: CPT

## 2022-04-04 NOTE — DISCUSSION/SUMMARY
[FreeTextEntry1] : YOLIS MOORE  is a 51 year M  who presents today April 4, 2022 with the above history and the following active issues. \par \par #1 Nonischemic, dilated cardiomyopathy. His ejection fraction 30-35%.  Class 2 heart failure symptoms without signs of volume overload. Tolerating optimal medical therapy well including full dose entresto, coreg, hydralazine, aldactone, and torsemide. Renal function and electrolytes are stable per recent labs.  BNP level normal.  He denies any new symptoms today.  \par He is using torsemide as needed.  No significant signs of volume overload.\par Repeat labs ordered.\par Based on recent data SGLT2 inhibitor risk benefits alternatives side effects reviewed in presence of his systolic LV dysfunction/nonischemic dilated cardiomyopathy with class II heart failure symptoms.farxiga 5 mg started.  Any significant side effects he will contact us immediately.  Watch for infection in the groin region as well as urinary symptoms were discussed.  This will also help with his prediabetes.\par Increasing exercise.  Weight reduction was strongly recommended.\par Continue sleep apnea management\par Labs ordered.\par Echocardiogram for evaluation of LV ejection fraction dimensions discussed.\par #2 AICD. A single lead MDT. Interrogated today. No new recorded events. Continue to follow. Stable battery and lead measurements. He declines remote monitoring. Will cont to check q3 months in office. \par \par #3 hyperlipidemia. lipids very well controlled on simvastatin. cont current dose and lifestyle modification measures.\par \par #4 morbid obesity. Weight reduction is strongly recommended.  Understands relationship with obesity and cardiovascular disease specifically his cardiomyopathy and heart failure\par \par #5 sleep apnea. Obstructive. Continue use of CPAP on a regular basis. \par \par #6 history of mitral insufficiency. Surveillance monitoring. No SBE prophylaxis required. \par \par #7  Anemia.  Normocytic.  Follow-up with primary care physician.  \par \par Counseling regarding low saturated fat, salt and carbohydrate intake was reviewed. Active lifestyle and regular. Exercise along with weight management is advised.\par All the above were at length reviewed. Answered all the questions. Thank you very much for this kind referral. Please do not hesitate to give me a call for any question.\par Part of this transcription was done with voice recognition software and phonetically similar errors are common. I apologize for that. Please do not hesitate to call for any questions due to above.\par \par Follow-up as advised\par Sincerely,\par Sita Macias MD,FACC,FASE\par

## 2022-04-04 NOTE — REASON FOR VISIT
[Symptom and Test Evaluation] : symptom and test evaluation [Cardiac Failure] : cardiac failure [Arrhythmia/ECG Abnorrmalities] : arrhythmia/ECG abnormalities [Structural Heart and Valve Disease] : structural heart and valve disease [Hyperlipidemia] : hyperlipidemia [Hypertension] : hypertension [FreeTextEntry3] : Dr. Rivera [FreeTextEntry1] : YOLIS MOORE  is a 51 year M  who presents today 4/4/21 in clinical follow-up. Overall he has been feeling well. No new exertional complaints. Medications have remained unchanged. Recent labs have been reviewed with the patient. No recent illness or hospital stay.  Does not do regular exercises.  Has been using a CPAP regularly.  Unable to lose weight.  \par AICD interrogated today with normal sensing, capture, impedance and battery longevity. \par \par Today he denies chest pain, pressure, unusual shortness of breath, lightheadedness, dizziness, near syncope or syncope. \par \par

## 2022-04-04 NOTE — ASSESSMENT
[FreeTextEntry1] : past tests for reference:\par \par Coronary angiography. 2004. Normal coronary arteries\par \par Echocardiogram was done on 4/8/2016.  LV ejection fraction 30%.  LVIDD 7.3 cm.  Left atrial size 4.9 cm.  Four-chamber dilatation.  Mild mitral and tricuspid regurgitation.  PAS P 27 mmHg.  Overall no significant change.  LVIDD has been fluctuating in different echocardiograms.  The remaining between 6.6-7.5 cm.\par \par Echocardiogram January 2019 and a ejection fraction 30-35%. LVIDD 6.0. Mild mitral regurgitation. Moderate global LV systolic dysfunction. Concentric LVH. Normal pulmonary pressures.\par \par ICD revision/generator change by Dr. Valentino Feb 2019.\par \par Labs 4/15/2020, hgb 11.6, k 3.9, creat 0.86, LDL 72, A1c 5.6, TSH 1.74\par \par EKG.  Normal sinus rhythm IVCD\par Labs from March 2021 reviewed.  Elevated CPK.  Mildly elevated again in April and July.\par Anemia noted with hemoglobin 10.8 platelet count 272 sodium 138 potassium 4.2 creatinine 0.84 LFT normal LDL 62 HDL 27 triglycerides 114 total cholesterol 112\par \par AICD single lead Medtronic normal functioning\par \par Labs 10/12/2021 Sodium 137, potassium 4.3, BUN 13, Creat 0.95, WBC 10.6, Hgb 11.1, Hct 34.6, plat 276\par \par Reviewed on April 4, 2022.\par Most recent labs in the chart reviewed which includes hemoglobin A1c of 6.1.

## 2022-04-04 NOTE — PROCEDURE
[No] : not [NSR] : normal sinus rhythm [See Device Printout] : See device printout [ICD] : Implantable cardioverter-defibrillator [VVI] : VVI [Voltage: ___ volts] : Voltage was [unfilled] volts [Threshold Testing Performed] : Threshold testing was performed [Lead Imp:  ___ohms] : lead impedance was [unfilled] ohms [Sensing Amplitude ___mv] : sensing amplitude was [unfilled] mv [___V @] : [unfilled] V [___ ms] : [unfilled] ms [None] : none [de-identified] : Medtronic [de-identified] : Yvon JOSE VR [id number] [de-identified] : [id number] [de-identified] : 2/11/19 [de-identified] : 38 [de-identified] : 8.2 years [de-identified] : \par Vpaced <0.1%\par \par Shock Therapy\par VF at 200bpm: ATP during charge, 35J x6\par FVT via VF at 231bpm: burst (1), 35J x5\par VT: Off\par \par 1 VHR, short 2 beats, reviewed with RP, no change\par \par Discussed home monitoring with the patient and at this time he declines.\par Pt has no new complaints today. \par \par Next ICD interrogation in 4 months as per Dr. Macias with an office visit same day\par \par Sincerely,\par \par Leigh Garibay PA-C\par Patients history, testing and plan reviewed with supervising MD: Dr. Sita Macias

## 2022-04-04 NOTE — CARDIOLOGY SUMMARY
[___] : [unfilled] [de-identified] : 7/27/21 nsr IVCD [de-identified] : August 5, 2021.  EF 36% mild mitral regurgitation normal left atrium moderate global LV systolic dysfunction.  [place] akinetic mild concentric LVH.  Left ventricular enlargement.

## 2022-04-04 NOTE — HISTORY OF PRESENT ILLNESS
[FreeTextEntry1] : \par \par •History of dilated cardiomyopathy, class II functional status, LV end diastolic dimension 6.6. MUGA test earlier in 2014, 42%; ejection fraction by echocardiogram less than 35%, single lead AICD, last MVO2 16 mL/kg/min. It was done by Dr. Lopez. overall dimensions 6.6-7.5 cm.  Last echocardiogram done 8/27/20, LVEF 30-35% with moderate global LV systolic dysfunction. Mild VHD noted. These findings show no significant changes from prior study\par \par •Single lead Medtronic AICD. S/P gen change Feb 2019. \par \par •Nonsustained ventricular tachycardia. Paroxysmal supraventricular tachycardia/sinus tach. On high dose beta blocker. \par \par •Mild non rheumatic valvular heart disease, stable.\par \par •Obstructive sleep apnea on CPAP.\par \par •Obesity, morbid, persistent.\par \par •Dyslipidemia, mixed, on statin therapy.\par \par •History of Lyme disease. No recurrence.\par

## 2022-04-04 NOTE — PHYSICAL EXAM
[General Appearance - Well Developed] : well developed [Normal Appearance] : normal appearance [Well Groomed] : well groomed [General Appearance - Well Nourished] : well nourished [No Deformities] : no deformities [General Appearance - In No Acute Distress] : no acute distress [] : no respiratory distress [Respiration, Rhythm And Depth] : normal respiratory rhythm and effort [Exaggerated Use Of Accessory Muscles For Inspiration] : no accessory muscle use [Auscultation Breath Sounds / Voice Sounds] : lungs were clear to auscultation bilaterally [Heart Rate And Rhythm] : heart rate and rhythm were normal [Heart Sounds] : normal S1 and S2 [Arterial Pulses Normal] : the arterial pulses were normal [Edema] : no peripheral edema present [Veins - Varicosity Changes] : no varicosital changes were noted in the lower extremities [Nail Clubbing] : no clubbing of the fingernails [Cyanosis, Localized] : no localized cyanosis [FreeTextEntry1] : LUSB 1/6 at the base, distant heart sounds.  Equal peripheral pulses.

## 2022-04-26 ENCOUNTER — NON-APPOINTMENT (OUTPATIENT)
Age: 52
End: 2022-04-26

## 2022-05-02 NOTE — H&P PST ADULT - NSICDXPASTSURGICALHX_GEN_ALL_CORE_FT
Subjective:       Patient ID: Jes Livingston is a 70 y.o. male.    Chief Complaint: Follow-up    Established patient follows up for management of chronic medical illnesses with complaints today. Please see dictation and ROS for interval problems, specific complaints and disease management discussion.    P, S, Fm, Soc Hx's; Meds, allergies reviewed and reconciled.  Health maintenance file reviewed and addressed items due. Recent applicable lab, imaging and cardiovascular results reviewed.  Problem list items reviewed and modified or added entries (in the overview section) may not be transcribed into this encounter note due to note writer format.    [facility] - states he needs medication refills.  Last seen last summer.  States he takes approximately 1 tramadol per day for knee pain.  While reviewing his chart today noted that he never followed up with Urology concerning a diagnostic PSA follow-up, elevated PSA and referral.  Saw Dr. Bowen July 8, 2021. Additionally, never scheduled with Neurology concerning memory.  Has chronic knee pain bilaterally, status post total knee replacements.  No recent visit with Orthopedics.  Signed a pain contract March 11, 2019.   was reviewed today.  Last refill was July of 2021. States he has been living with his son.  Seems to be unaware several previous referrals.    Was having issues with the electrolytes as hydrochlorothiazide had been stopped in the past.  Renal function improved.  Overdue for recheck.      Review of Systems   Constitutional: Negative for appetite change, chills, diaphoresis, fatigue and fever.   HENT: Negative for congestion, postnasal drip, rhinorrhea, sore throat and trouble swallowing.    Eyes: Negative for visual disturbance.   Respiratory: Negative for cough, choking, chest tightness, shortness of breath and wheezing.    Cardiovascular: Negative for chest pain and leg swelling.   Gastrointestinal: Negative for abdominal distention, abdominal pain, diarrhea,  nausea and vomiting.   Genitourinary: Negative for difficulty urinating and hematuria.   Musculoskeletal: Positive for arthralgias and gait problem. Negative for myalgias.   Skin: Negative for rash.   Neurological: Negative for weakness, light-headedness and headaches.   Psychiatric/Behavioral: Negative for confusion and dysphoric mood.       Objective:      Physical Exam  Vitals and nursing note reviewed.   Constitutional:       General: He is not in acute distress.     Appearance: He is well-developed.   Pulmonary:      Effort: Pulmonary effort is normal.   Musculoskeletal:      Cervical back: Neck supple.      Right lower leg: No edema.      Left lower leg: No edema.   Skin:     General: Skin is warm and dry.      Findings: No rash.   Neurological:      Mental Status: He is alert and oriented to person, place, and time.   Psychiatric:         Behavior: Behavior normal.         Thought Content: Thought content normal.         Judgment: Judgment normal.         Assessment:       1. Osteoarthritis of both knees, unspecified osteoarthritis type    2. Pulmonary nodules    3. Personal history of nicotine dependence     4. Chronic kidney disease, unspecified CKD stage    5. Memory changes    6. Rising PSA level    7. Status post total bilateral knee replacement    8. Noncompliance    9. Colon cancer screening    10. Low back pain without sciatica, unspecified back pain laterality, unspecified chronicity    11. Osteoarthritis of right knee, unspecified osteoarthritis type    12. Post-traumatic osteoarthritis of right shoulder        Plan:     Medication List with Changes/Refills   Current Medications    ATORVASTATIN (LIPITOR) 10 MG TABLET    Take 1 tablet (10 mg total) by mouth once daily.    METOPROLOL TARTRATE (LOPRESSOR) 50 MG TABLET    Take 1 tablet (50 mg total) by mouth 2 (two) times daily.    OFLOXACIN (OCUFLOX) 0.3 % OPHTHALMIC SOLUTION    INSTILL 1 DROP INTO LEFT EYE 3 TIMES DAILY    PREDNISOLONE ACETATE (PRED  FORTE) 1 % DRPS    INSTILL 1 DROP INTO LEFT EYE 3 TIMES DAILY   Changed and/or Refilled Medications    Modified Medication Previous Medication    TRAMADOL (ULTRAM) 50 MG TABLET traMADoL (ULTRAM) 50 mg tablet       Take 1 tablet (50 mg total) by mouth every 24 hours as needed for Pain.    TAKE 1 TABLET (50 MG TOTAL) BY MOUTH 2 (TWO) TIMES DAILY AS NEEDED FOR PAIN.     Jes was seen today for follow-up.    Diagnoses and all orders for this visit:    Osteoarthritis of both knees, unspecified osteoarthritis type  -     Ambulatory referral/consult to Orthopedics; Future    Pulmonary nodules  -     CT Chest Lung Screening Low Dose; Future    Personal history of nicotine dependence   -     CT Chest Lung Screening Low Dose; Future    Chronic kidney disease, unspecified CKD stage  -     Renal Function Panel; Future    Memory changes  -     Ambulatory referral/consult to Neurology; Future    Rising PSA level  -     Ambulatory referral/consult to Urology; Future    Status post total bilateral knee replacement  -     Ambulatory referral/consult to Orthopedics; Future    Noncompliance  Comments:  multiple no show and cancelled visits noted on appointment scheduling review    Colon cancer screening  -     Case Request Endoscopy: COLONOSCOPY    Low back pain without sciatica, unspecified back pain laterality, unspecified chronicity  -     traMADoL (ULTRAM) 50 mg tablet; Take 1 tablet (50 mg total) by mouth every 24 hours as needed for Pain.    Osteoarthritis of right knee, unspecified osteoarthritis type  -     traMADoL (ULTRAM) 50 mg tablet; Take 1 tablet (50 mg total) by mouth every 24 hours as needed for Pain.    Post-traumatic osteoarthritis of right shoulder  -     traMADoL (ULTRAM) 50 mg tablet; Take 1 tablet (50 mg total) by mouth every 24 hours as needed for Pain.      See meds, orders, follow up, routing and instructions sections of encounter and AVS. Discussed with patient and provided on AVS.    Patient receives  opioid pain and/or controlled medication management for diagnosed chronic medical indication, documented in the EMR problem list. Patient has signed a pain or controlled medication contract, which is periodically re-evaluated, signed and submitted to the nursing staff for scan into the EMR.    [place] per [place] and [place] guidelines (generally at every refill visit or refill request) through the link provided on the [facility]. At this time, there is no evidence of medication misuse or other compliance issues. To the best of our ability, compliance with state and Federal regulations has been addressed. UDS performed per protocol (see opioid risk tool) when/if indicated (noting conflicting guidance on flow chart vs content list).     The Epic EMR Risk tool is reviewed and information updated periodically.      I am a bit concerned about his inability keep follow-ups and follow-up lab, etc..  The will be due for a 3 month follow-up or medication refill and can  his compliance at that time.  I went through his items above that were due.    Today's appointment was >25 minutes including chart review (such as review of consult notes, op notes, ER notes, labs, imaging, path, cultures, etc.), medication reconciliation and adjustment, and in some cases >50% time spent in counseling.           PAST SURGICAL HISTORY:  H/O arthroscopy of knee left patella; 2 years ago    History of implantable cardiac defibrillator (ICD)      PAST SURGICAL HISTORY:  H/O arthroscopy of knee left patella; 2 years ago    History of implantable cardiac defibrillator (ICD) Veveo [id number] Evera Xt VR implanted on 02/11/2019

## 2022-05-06 ENCOUNTER — APPOINTMENT (OUTPATIENT)
Dept: FAMILY MEDICINE | Facility: CLINIC | Age: 52
End: 2022-05-06
Payer: MEDICARE

## 2022-05-06 VITALS
TEMPERATURE: 97.7 F | HEIGHT: 70 IN | HEART RATE: 96 BPM | DIASTOLIC BLOOD PRESSURE: 72 MMHG | OXYGEN SATURATION: 96 % | BODY MASS INDEX: 43.67 KG/M2 | SYSTOLIC BLOOD PRESSURE: 112 MMHG | WEIGHT: 305 LBS

## 2022-05-06 DIAGNOSIS — G47.30 SLEEP APNEA, UNSPECIFIED: ICD-10-CM

## 2022-05-06 DIAGNOSIS — D72.829 ELEVATED WHITE BLOOD CELL COUNT, UNSPECIFIED: ICD-10-CM

## 2022-05-06 PROCEDURE — 99214 OFFICE O/P EST MOD 30 MIN: CPT | Mod: 25

## 2022-05-06 PROCEDURE — 36415 COLL VENOUS BLD VENIPUNCTURE: CPT

## 2022-05-06 NOTE — HISTORY OF PRESENT ILLNESS
[FreeTextEntry1] : leukocytosis [de-identified] : seen by cardio \par a1c 6.1 \par farxiga \par off torsemide \par everardo\par bmi 43 \par walks q od    will go daily \par diet  need portion control

## 2022-05-06 NOTE — PLAN
[FreeTextEntry1] : 51-year-old male presents for lab work and evaluation\par Prior blood work by cardiology revealed a white count of 11.6\par Leukocytosis–routine lab work by cardiology showed a white count of 11.6.  He is to have a repeat white count and CBC with differential performed today\par Diabetes mellitus type 2–placed on Farxiga.  Torsemide was DC'd hemoglobin A1c 6.1\par Dilated cardiomyopathy–follows with cardiology Coreg 6.25 mg twice daily hydralazine to control blood pressure.  112/72.\par Lab work is drawn for CBC and BMP\par

## 2022-05-18 LAB
BASOPHILS # BLD AUTO: 0.07 K/UL
BASOPHILS NFR BLD AUTO: 0.6 %
EOSINOPHIL # BLD AUTO: 0.23 K/UL
EOSINOPHIL NFR BLD AUTO: 1.9 %
HCT VFR BLD CALC: 39.6 %
HGB BLD-MCNC: 12.6 G/DL
IMM GRANULOCYTES NFR BLD AUTO: 0.3 %
LYMPHOCYTES # BLD AUTO: 2.76 K/UL
LYMPHOCYTES NFR BLD AUTO: 22.6 %
MAN DIFF?: NORMAL
MCHC RBC-ENTMCNC: 27.5 PG
MCHC RBC-ENTMCNC: 31.8 GM/DL
MCV RBC AUTO: 86.5 FL
MONOCYTES # BLD AUTO: 0.77 K/UL
MONOCYTES NFR BLD AUTO: 6.3 %
NEUTROPHILS # BLD AUTO: 8.36 K/UL
NEUTROPHILS NFR BLD AUTO: 68.3 %
PLATELET # BLD AUTO: 321 K/UL
RBC # BLD: 4.58 M/UL
RBC # FLD: 13.1 %
WBC # FLD AUTO: 12.23 K/UL

## 2022-06-21 ENCOUNTER — RX RENEWAL (OUTPATIENT)
Age: 52
End: 2022-06-21

## 2022-08-01 ENCOUNTER — NON-APPOINTMENT (OUTPATIENT)
Age: 52
End: 2022-08-01

## 2022-08-22 ENCOUNTER — APPOINTMENT (OUTPATIENT)
Dept: CARDIOLOGY | Facility: CLINIC | Age: 52
End: 2022-08-22

## 2022-08-22 VITALS
HEIGHT: 70 IN | TEMPERATURE: 97.1 F | SYSTOLIC BLOOD PRESSURE: 100 MMHG | WEIGHT: 295 LBS | DIASTOLIC BLOOD PRESSURE: 70 MMHG | BODY MASS INDEX: 42.23 KG/M2 | HEART RATE: 69 BPM | OXYGEN SATURATION: 99 %

## 2022-08-22 PROCEDURE — 93282 PRGRMG EVAL IMPLANTABLE DFB: CPT

## 2022-08-22 PROCEDURE — 93306 TTE W/DOPPLER COMPLETE: CPT

## 2022-08-22 PROCEDURE — 99214 OFFICE O/P EST MOD 30 MIN: CPT

## 2022-08-22 NOTE — REASON FOR VISIT
[Symptom and Test Evaluation] : symptom and test evaluation [Cardiac Failure] : cardiac failure [Arrhythmia/ECG Abnorrmalities] : arrhythmia/ECG abnormalities [Structural Heart and Valve Disease] : structural heart and valve disease [Hyperlipidemia] : hyperlipidemia [Hypertension] : hypertension [FreeTextEntry3] : Dr. Rivera [FreeTextEntry1] : YOLIS MOORE  is a 51 year M  who presents today August 22, 2022 in clinical follow-up. Overall he has been feeling well. No new exertional complaints. Medications have remained unchanged. Recent labs have been reviewed with the patient. No recent illness or hospital stay.  Does not do regular exercises.  Has been using a CPAP regularly.  Unable to lose weight.  \par AICD interrogated today with normal sensing, capture, impedance and battery longevity. \par \par Today he denies chest pain, pressure, unusual shortness of breath, lightheadedness, dizziness, near syncope or syncope. \par \par

## 2022-08-22 NOTE — DISCUSSION/SUMMARY
[FreeTextEntry1] : YOLIS MOORE  is a 51 year M  who presents today August 22 2022 with the above history and the following active issues. \par \par #1 Nonischemic, dilated cardiomyopathy. His ejection fraction 30-35%.  Class 2 heart failure symptoms without signs of volume overload. Tolerating optimal medical therapy well including full dose entresto, coreg, hydralazine, aldactone, and torsemide. Renal function and electrolytes are stable per recent labs.  BNP level normal.  He denies any new symptoms today.  \par He is using torsemide as needed.  No significant signs of volume overload.\par Repeat labs ordered.\par Tolerating  Farxiga well .  Watch for infection in the groin region as well as urinary symptoms were discussed.  This will also help with his prediabetes.\par Increasing exercise.  Weight reduction was strongly recommended.\par Continue sleep apnea management\par Labs ordered.\par Echocardiogram stable\par #2 AICD. A single lead MDT. Interrogated today. No new recorded events. Continue to follow. Stable battery and lead measurements. He declines remote monitoring. Will cont to check q3 months in office. \par \par #3 hyperlipidemia. lipids very well controlled on simvastatin. cont current dose and lifestyle modification measures.\par \par #4 morbid obesity. Weight reduction is strongly recommended.  Understands relationship with obesity and cardiovascular disease specifically his cardiomyopathy and heart failure\par \par #5 sleep apnea. Obstructive. Continue use of CPAP on a regular basis. \par \par #6 history of mitral insufficiency.  Stable echocardiogram August 22, 2022. No SBE prophylaxis required. \par \par \par Counseling regarding low saturated fat, salt and carbohydrate intake was reviewed. Active lifestyle and regular. Exercise along with weight management is advised.\par All the above were at length reviewed. Answered all the questions. Thank you very much for this kind referral. Please do not hesitate to give me a call for any question.\par Part of this transcription was done with voice recognition software and phonetically similar errors are common. I apologize for that. Please do not hesitate to call for any questions due to above.\par \par Follow-up as advised\par Sincerely,\par Sita Macias MD,FACC,FASE\par

## 2022-08-22 NOTE — CARDIOLOGY SUMMARY
[___] : [unfilled] [de-identified] : 7/27/21 nsr IVCD [de-identified] : August 5, 2021.  EF 36% mild mitral regurgitation normal left atrium moderate global LV systolic dysfunction.  [place] akinetic mild concentric LVH.  Left ventricular enlargement.\par August 22, 2022 PA EF 35 to 40% mild mitral regurgitation LVH.

## 2022-08-22 NOTE — ASSESSMENT
[FreeTextEntry1] : past tests for reference:\par \par Coronary angiography. 2004. Normal coronary arteries\par \par Echocardiogram was done on 4/8/2016.  LV ejection fraction 30%.  LVIDD 7.3 cm.  Left atrial size 4.9 cm.  Four-chamber dilatation.  Mild mitral and tricuspid regurgitation.  PAS P 27 mmHg.  Overall no significant change.  LVIDD has been fluctuating in different echocardiograms.  The remaining between 6.6-7.5 cm.\par \par Echocardiogram January 2019 and a ejection fraction 30-35%. LVIDD 6.0. Mild mitral regurgitation. Moderate global LV systolic dysfunction. Concentric LVH. Normal pulmonary pressures.\par \par ICD revision/generator change by Dr. Valentino Feb 2019.\par \par Labs 4/15/2020, hgb 11.6, k 3.9, creat 0.86, LDL 72, A1c 5.6, TSH 1.74\par \par EKG.  Normal sinus rhythm IVCD\par Labs from March 2021 reviewed.  Elevated CPK.  Mildly elevated again in April and July.\par Anemia noted with hemoglobin 10.8 platelet count 272 sodium 138 potassium 4.2 creatinine 0.84 LFT normal LDL 62 HDL 27 triglycerides 114 total cholesterol 112\par \par AICD single lead Medtronic normal functioning\par \par Labs 10/12/2021 Sodium 137, potassium 4.3, BUN 13, Creat 0.95, WBC 10.6, Hgb 11.1, Hct 34.6, plat 276\par \par Reviewed on April 4, 2022.\par Most recent labs in the chart reviewed which includes hemoglobin A1c of 6.1.\par \par Reviewed on August 22, 2022.\par Echocardiogram August 22, 2022 as noted above.\par AICD check reviewed.  6 seconds of possible atrial tachycardia/narrow complex arrhythmias less likely to be an SVT at around 130 to 140 bpm.

## 2022-08-22 NOTE — PROCEDURE
[No] : not [NSR] : normal sinus rhythm [See Device Printout] : See device printout [ICD] : Implantable cardioverter-defibrillator [VVI] : VVI [Voltage: ___ volts] : Voltage was [unfilled] volts [Threshold Testing Performed] : Threshold testing was performed [Lead Imp:  ___ohms] : lead impedance was [unfilled] ohms [Sensing Amplitude ___mv] : sensing amplitude was [unfilled] mv [___V @] : [unfilled] V [___ ms] : [unfilled] ms [None] : none [de-identified] : Medtronic [de-identified] : Yvon JOSE VR [id number] [de-identified] : [id number] [de-identified] : 2/11/19 [de-identified] : 38 [de-identified] : 7.8 years [de-identified] : \par Vpaced <0.1%\par \par Shock Therapy\par VF at 200bpm: ATP during charge, 35J x6\par FVT via VF at 231bpm: burst (1), 35J x5\par VT: Off\par \par Discussed home monitoring with the patient and at this time he declines.\par Pt has no new complaints today. \par 3 HVR, longest 8 sec, regular R-R interval, QRS complex c/w baseline complex\par \par Next ICD interrogation in 4 months as per Dr. Macias with an office visit same day\par \par Sincerely,\par \par Leigh Garibay PA-C\par Patients history, testing and plan reviewed with supervising MD: Dr. Sita Macias

## 2022-08-24 ENCOUNTER — NON-APPOINTMENT (OUTPATIENT)
Age: 52
End: 2022-08-24

## 2022-09-09 ENCOUNTER — APPOINTMENT (OUTPATIENT)
Dept: FAMILY MEDICINE | Facility: CLINIC | Age: 52
End: 2022-09-09

## 2022-09-09 VITALS
RESPIRATION RATE: 15 BRPM | BODY MASS INDEX: 43.67 KG/M2 | HEIGHT: 70 IN | SYSTOLIC BLOOD PRESSURE: 98 MMHG | TEMPERATURE: 97.4 F | DIASTOLIC BLOOD PRESSURE: 60 MMHG | OXYGEN SATURATION: 96 % | HEART RATE: 79 BPM | WEIGHT: 305 LBS

## 2022-09-09 DIAGNOSIS — I47.2 VENTRICULAR TACHYCARDIA: ICD-10-CM

## 2022-09-09 DIAGNOSIS — E66.01 MORBID (SEVERE) OBESITY DUE TO EXCESS CALORIES: ICD-10-CM

## 2022-09-09 PROCEDURE — 99214 OFFICE O/P EST MOD 30 MIN: CPT | Mod: 25

## 2022-09-09 PROCEDURE — 36415 COLL VENOUS BLD VENIPUNCTURE: CPT

## 2022-09-09 NOTE — HISTORY OF PRESENT ILLNESS
[FreeTextEntry1] : renew  [de-identified] : doing well \par cardio   v tach   lvsd \par a1c 6.1 11/21

## 2022-09-09 NOTE — PLAN
[FreeTextEntry1] : 51-year-old gentleman refers returns for evaluation.  His medications have been renewed last week\par Left ventricular systolic dysfunction/dilated cardiomyopathy/nonsustained V. tach/pacemaker–he follows with cardiology.  Medications include Entresto/Coreg/Aldactone/torsemide.  History of elevated lipids\par Diabetes mellitus type 2–she has been on Farxiga 10 mg daily.  His last hemoglobin A1c was 6.3.  Lab work is drawn today\par Metabolic syndrome–5 foot 10 inch 305 pound male BMI greater than 40.  The importance of diet and exercise to affect weight loss in the control of comorbidities are discussed\par Nontoxic goiter–lab work is drawn today for the evaluation of thyroid function and metabolic evaluation

## 2022-09-09 NOTE — HEALTH RISK ASSESSMENT
[0] : 2) Feeling down, depressed, or hopeless: Not at all (0) [PHQ-2 Negative - No further assessment needed] : PHQ-2 Negative - No further assessment needed [[id number]] : 0

## 2022-09-09 NOTE — REVIEW OF SYSTEMS
Laparoscopic converted to open cholecystectomy.  Gangrenous cholecystitis, friable gallbladder.  Bleeding encountered from friable infundibulum, avulsion of cystic artery, which was controlled after converting to open  Total EBL 200cc. 1 u PRBCs given in OR; Pt remained HD stable throughout case and did not require pressors.  Gallstone/sludge spillage was cleaned and irrigated/suctioned  Hammad drain left in GB fossa, connected to LANCE bulb.  Pt was straight-cathed at end of case, moeller not left in place.  150cc uop
[Negative] : Heme/Lymph

## 2022-09-16 LAB
ALBUMIN SERPL ELPH-MCNC: 5 G/DL
ALP BLD-CCNC: 72 U/L
ALT SERPL-CCNC: 28 U/L
ANION GAP SERPL CALC-SCNC: 10 MMOL/L
AST SERPL-CCNC: 25 U/L
BASOPHILS # BLD AUTO: 0.08 K/UL
BASOPHILS NFR BLD AUTO: 0.7 %
BILIRUB SERPL-MCNC: 0.4 MG/DL
BUN SERPL-MCNC: 11 MG/DL
CALCIUM SERPL-MCNC: 9.8 MG/DL
CHLORIDE SERPL-SCNC: 103 MMOL/L
CHOLEST SERPL-MCNC: 130 MG/DL
CO2 SERPL-SCNC: 20 MMOL/L
CREAT SERPL-MCNC: 1.05 MG/DL
EGFR: 85 ML/MIN/1.73M2
EOSINOPHIL # BLD AUTO: 0.18 K/UL
EOSINOPHIL NFR BLD AUTO: 1.5 %
ESTIMATED AVERAGE GLUCOSE: 131 MG/DL
GLUCOSE SERPL-MCNC: 104 MG/DL
HBA1C MFR BLD HPLC: 6.2 %
HCT VFR BLD CALC: 41.5 %
HDLC SERPL-MCNC: 29 MG/DL
HGB BLD-MCNC: 13 G/DL
IMM GRANULOCYTES NFR BLD AUTO: 0.3 %
LDLC SERPL CALC-MCNC: 78 MG/DL
LYMPHOCYTES # BLD AUTO: 2.41 K/UL
LYMPHOCYTES NFR BLD AUTO: 19.9 %
MAN DIFF?: NORMAL
MCHC RBC-ENTMCNC: 26.4 PG
MCHC RBC-ENTMCNC: 31.3 GM/DL
MCV RBC AUTO: 84.2 FL
MONOCYTES # BLD AUTO: 0.94 K/UL
MONOCYTES NFR BLD AUTO: 7.8 %
NEUTROPHILS # BLD AUTO: 8.44 K/UL
NEUTROPHILS NFR BLD AUTO: 69.8 %
NONHDLC SERPL-MCNC: 101 MG/DL
PLATELET # BLD AUTO: 306 K/UL
POTASSIUM SERPL-SCNC: 4.9 MMOL/L
PROT SERPL-MCNC: 7.7 G/DL
PSA SERPL-MCNC: 0.45 NG/ML
RBC # BLD: 4.93 M/UL
RBC # FLD: 13.3 %
SODIUM SERPL-SCNC: 133 MMOL/L
TRIGL SERPL-MCNC: 116 MG/DL
TSH SERPL-ACNC: 1.88 UIU/ML
WBC # FLD AUTO: 12.09 K/UL

## 2022-09-25 ENCOUNTER — RX RENEWAL (OUTPATIENT)
Age: 52
End: 2022-09-25

## 2022-11-09 ENCOUNTER — RX RENEWAL (OUTPATIENT)
Age: 52
End: 2022-11-09

## 2022-11-24 ENCOUNTER — NON-APPOINTMENT (OUTPATIENT)
Age: 52
End: 2022-11-24

## 2022-11-29 ENCOUNTER — APPOINTMENT (OUTPATIENT)
Dept: CARDIOLOGY | Facility: CLINIC | Age: 52
End: 2022-11-29

## 2022-11-29 ENCOUNTER — NON-APPOINTMENT (OUTPATIENT)
Age: 52
End: 2022-11-29

## 2022-11-29 VITALS
BODY MASS INDEX: 44.52 KG/M2 | WEIGHT: 311 LBS | HEIGHT: 70 IN | DIASTOLIC BLOOD PRESSURE: 68 MMHG | SYSTOLIC BLOOD PRESSURE: 96 MMHG | TEMPERATURE: 97.3 F | OXYGEN SATURATION: 98 % | HEART RATE: 73 BPM

## 2022-11-29 PROCEDURE — 93282 PRGRMG EVAL IMPLANTABLE DFB: CPT

## 2022-11-29 PROCEDURE — 93000 ELECTROCARDIOGRAM COMPLETE: CPT | Mod: 59

## 2022-11-29 PROCEDURE — 99214 OFFICE O/P EST MOD 30 MIN: CPT

## 2022-11-29 RX ORDER — TORSEMIDE 20 MG/1
20 TABLET ORAL
Refills: 0 | Status: DISCONTINUED | COMMUNITY
End: 2022-11-29

## 2022-11-29 NOTE — ASSESSMENT
[FreeTextEntry1] : past tests for reference:\par \par Coronary angiography. 2004. Normal coronary arteries\par \par Echocardiogram was done on 4/8/2016.  LV ejection fraction 30%.  LVIDD 7.3 cm.  Left atrial size 4.9 cm.  Four-chamber dilatation.  Mild mitral and tricuspid regurgitation.  PAS P 27 mmHg.  Overall no significant change.  LVIDD has been fluctuating in different echocardiograms.  The remaining between 6.6-7.5 cm.\par \par Echocardiogram January 2019 and a ejection fraction 30-35%. LVIDD 6.0. Mild mitral regurgitation. Moderate global LV systolic dysfunction. Concentric LVH. Normal pulmonary pressures.\par \par ICD revision/generator change by Dr. Valentino Feb 2019.\par \par Labs 4/15/2020, hgb 11.6, k 3.9, creat 0.86, LDL 72, A1c 5.6, TSH 1.74\par \par EKG.  Normal sinus rhythm IVCD\par Labs from March 2021 reviewed.  Elevated CPK.  Mildly elevated again in April and July.\par Anemia noted with hemoglobin 10.8 platelet count 272 sodium 138 potassium 4.2 creatinine 0.84 LFT normal LDL 62 HDL 27 triglycerides 114 total cholesterol 112\par \par AICD single lead Medtronic normal functioning\par \par Labs 10/12/2021 Sodium 137, potassium 4.3, BUN 13, Creat 0.95, WBC 10.6, Hgb 11.1, Hct 34.6, plat 276\par \par Reviewed on April 4, 2022.\par Most recent labs in the chart reviewed which includes hemoglobin A1c of 6.1.\par \par Reviewed on August 22, 2022.\par Echocardiogram August 22, 2022 as noted above.\par AICD check reviewed.  6 seconds of possible atrial tachycardia/narrow complex arrhythmias less likely to be an SVT at around 130 to 140 bpm.\par \par Reviewed November 29, 2022\par EKG reviewed which showed normal sinus rhythm IVCD\par Labs from September 2020 reviewed
Detail Level: Detailed
Detail Level: Zone

## 2022-11-29 NOTE — HISTORY OF PRESENT ILLNESS
[FreeTextEntry1] : \par HPI\par •History of dilated cardiomyopathy, class II functional status, LV end diastolic dimension 6.6. MUGA test earlier in 2014, 42%; ejection fraction by echocardiogram less than 35%, single lead AICD, last MVO2 16 mL/kg/min. It was done by Dr. Lopez. overall dimensions 6.6-7.5 cm.  Last echocardiogram done August 22, 2022 EF 35 to 40% LVIDD 6.9.  Normal left atrium\par \par •Single lead Medtronic AICD. S/P gen change Feb 2019. \par \par •Nonsustained ventricular tachycardia. Paroxysmal supraventricular tachycardia/sinus tach. On high dose beta blocker. \par \par •Mild non rheumatic valvular heart disease, stable.\par \par •Obstructive sleep apnea on CPAP.\par \par •Obesity, morbid, persistent.\par \par •Dyslipidemia, mixed, on statin therapy.\par \par •History of Lyme disease. No recurrence.\par

## 2022-11-29 NOTE — PROCEDURE
[No] : not [NSR] : normal sinus rhythm [See Device Printout] : See device printout [ICD] : Implantable cardioverter-defibrillator [VVI] : VVI [Voltage: ___ volts] : Voltage was [unfilled] volts [Threshold Testing Performed] : Threshold testing was performed [Lead Imp:  ___ohms] : lead impedance was [unfilled] ohms [Sensing Amplitude ___mv] : sensing amplitude was [unfilled] mv [___V @] : [unfilled] V [___ ms] : [unfilled] ms [None] : none [de-identified] : Medtronic [de-identified] : Yvon JOSE VR [id number] [de-identified] : [id number] [de-identified] : 2/11/19 [de-identified] : 38 [de-identified] : 7.4 years [de-identified] : \par Vpaced <0.1%\par \par Shock Therapy\par VF at 200bpm: ATP during charge, 35J x6\par FVT via VF at 231bpm: burst (1), 35J x5\par VT: Off\par \par Discussed home monitoring with the patient and at this time he declines.\par Pt has no new complaints today. \par \par Next ICD interrogation in 4 months as per Dr. Macias with an office visit same day\par \par Sincerely,\par \par Leigh Garibay PA-C\par Patients history, testing and plan reviewed with supervising MD: Dr. Sita Macias

## 2022-11-29 NOTE — CARDIOLOGY SUMMARY
[___] : [unfilled] [de-identified] : 7/27/21 nsr IVCD [de-identified] : August 5, 2021.  EF 36% mild mitral regurgitation normal left atrium moderate global LV systolic dysfunction.  [place] akinetic mild concentric LVH.  Left ventricular enlargement.\par August 22, 2022 PA EF 35 to 40% mild mitral regurgitation LVH.

## 2022-11-29 NOTE — REASON FOR VISIT
[Symptom and Test Evaluation] : symptom and test evaluation [Cardiac Failure] : cardiac failure [Arrhythmia/ECG Abnorrmalities] : arrhythmia/ECG abnormalities [Structural Heart and Valve Disease] : structural heart and valve disease [Hyperlipidemia] : hyperlipidemia [Hypertension] : hypertension [FreeTextEntry3] : Dr. Rivera [FreeTextEntry1] : YOLIS MOORE  is a 52 year M  who presents today November 29, 2022 in clinical follow-up. Overall he has been feeling well. No new exertional complaints. Medications have remained unchanged. Recent labs have been reviewed with the patient. No recent illness or hospital stay.  Does not do regular exercises.  Has been using a CPAP regularly.  Unable to lose weight.  \par AICD interrogated today with normal sensing, capture, impedance and battery longevity. \par \par Today he denies chest pain, pressure, unusual shortness of breath, lightheadedness, dizziness, near syncope or syncope. \par \par

## 2022-11-29 NOTE — DISCUSSION/SUMMARY
[FreeTextEntry1] : YOLIS MOORE  is a 52 year M  who presents today Nov 29, 2022 with the above history and the following active issues. \par \par #1 Nonischemic, dilated cardiomyopathy. His ejection fraction 30-35%.  Class 2 heart failure symptoms without signs of volume overload. Tolerating optimal medical therapy well including full dose entresto, coreg, hydralazine, aldactone, and torsemide. Renal function and electrolytes are stable per recent labs.  BNP level normal.  He denies any new symptoms today.  \par He is using torsemide as needed.  No significant signs of volume overload.\par Repeat labs ordered.\par Tolerating  Farxiga well .  Watch for infection in the groin region as well as urinary symptoms were discussed.  This will also help with his prediabetes.\par Increasing exercise.  Weight reduction was strongly recommended.\par Continue sleep apnea management\par Labs ordered.\par Echocardiogram stable\par #2 AICD. A single lead MDT. Interrogated today. No new recorded events. Continue to follow. Stable battery and lead measurements. He declines remote monitoring. Will cont to check q3 months in office. \par \par #3 hyperlipidemia. lipids very well controlled on simvastatin. cont current dose and lifestyle modification measures.\par \par #4 morbid obesity. Weight reduction is strongly recommended.  Understands relationship with obesity and cardiovascular disease specifically his cardiomyopathy and heart failure\par \par #5 sleep apnea. Obstructive. Continue use of CPAP on a regular basis. \par \par #6 history of mitral insufficiency.  Stable echocardiogram August 22, 2022. No SBE prophylaxis required. \par \par \par Counseling regarding low saturated fat, salt and carbohydrate intake was reviewed. Active lifestyle and regular. Exercise along with weight management is advised.\par All the above were at length reviewed. Answered all the questions. Thank you very much for this kind referral. Please do not hesitate to give me a call for any question.\par Part of this transcription was done with voice recognition software and phonetically similar errors are common. I apologize for that. Please do not hesitate to call for any questions due to above.\par \par Follow-up as advised\par Sincerely,\par Sita Macias MD,FACC,FASE\par

## 2022-12-05 NOTE — ASU PREOP CHECKLIST - NSSDAENDDT_GEN_ALL_CORE
CLINICAL PHARMACY NOTE: MEDS TO BEDS    Total # of Prescriptions Filled: 2   The following medications were delivered to the patient:  Keflex 500  Percocet 5/325    Additional Documentation: SPOKE WITH THONG . H&P needs faxed to [facility] we would greatly appreciate your comments    [] Parent/guardian of a minor must accompany their child and remain on the premises  the entire time they are under our care     [] Pediatric patients may bring favorite toy, blanket or comfort item with them    [] A caregiver or family member must remain with the patient during their stay if they are mentally handicapped, have dementia, disoriented or unable to use a call light or would be a safety concern if left unattended    [x] Please notify surgeon if you develop any illness between now and time of surgery (cold, cough, sore throat, fever, nausea, vomiting) or any signs of infections  including skin, wounds, and dental.    [] Other instructions    EDUCATIONAL MATERIALS PROVIDED:    [] PAT Preoperative Education Packet/Booklet     [] Medication List    [] Fluoroscopy Information Pamphlet    [] Transfusion bracelet applied with instructions    [] Joint replacement video reviewed    [] Shower with antibacterial soap and use CHG wipes provided the evening before surgery as instructed 23-Sep-2020 12:10

## 2023-01-22 ENCOUNTER — RX RENEWAL (OUTPATIENT)
Age: 53
End: 2023-01-22

## 2023-02-06 ENCOUNTER — APPOINTMENT (OUTPATIENT)
Dept: CARDIOLOGY | Facility: CLINIC | Age: 53
End: 2023-02-06
Payer: MEDICARE

## 2023-02-06 VITALS
SYSTOLIC BLOOD PRESSURE: 110 MMHG | HEART RATE: 78 BPM | DIASTOLIC BLOOD PRESSURE: 68 MMHG | WEIGHT: 306 LBS | OXYGEN SATURATION: 96 % | BODY MASS INDEX: 43.81 KG/M2 | HEIGHT: 70 IN

## 2023-02-06 PROCEDURE — 99214 OFFICE O/P EST MOD 30 MIN: CPT

## 2023-02-06 PROCEDURE — 93282 PRGRMG EVAL IMPLANTABLE DFB: CPT

## 2023-02-06 NOTE — ASSESSMENT
[FreeTextEntry1] : past tests for reference:\par \par Coronary angiography. 2004. Normal coronary arteries\par \par Echocardiogram was done on 4/8/2016.  LV ejection fraction 30%.  LVIDD 7.3 cm.  Left atrial size 4.9 cm.  Four-chamber dilatation.  Mild mitral and tricuspid regurgitation.  PAS P 27 mmHg.  Overall no significant change.  LVIDD has been fluctuating in different echocardiograms.  The remaining between 6.6-7.5 cm.\par \par Echocardiogram January 2019 and a ejection fraction 30-35%. LVIDD 6.0. Mild mitral regurgitation. Moderate global LV systolic dysfunction. Concentric LVH. Normal pulmonary pressures.\par \par ICD revision/generator change by Dr. Valentino Feb 2019.\par \par Labs 4/15/2020, hgb 11.6, k 3.9, creat 0.86, LDL 72, A1c 5.6, TSH 1.74\par \par EKG.  Normal sinus rhythm IVCD\par Labs from March 2021 reviewed.  Elevated CPK.  Mildly elevated again in April and July.\par Anemia noted with hemoglobin 10.8 platelet count 272 sodium 138 potassium 4.2 creatinine 0.84 LFT normal LDL 62 HDL 27 triglycerides 114 total cholesterol 112\par \par AICD single lead Medtronic normal functioning\par \par Labs 10/12/2021 Sodium 137, potassium 4.3, BUN 13, Creat 0.95, WBC 10.6, Hgb 11.1, Hct 34.6, plat 276\par \par Reviewed on April 4, 2022.\par Most recent labs in the chart reviewed which includes hemoglobin A1c of 6.1.\par \par Reviewed on August 22, 2022.\par Echocardiogram August 22, 2022 as noted above.\par AICD check reviewed.  6 seconds of possible atrial tachycardia/narrow complex arrhythmias less likely to be an SVT at around 130 to 140 bpm.\par \par Reviewed November 29, 2022\par EKG reviewed which showed normal sinus rhythm IVCD\par Labs from September 2022 reviewed\par \par Reviewed February 6, 2023\par Labs are requested\par ICD check reviewed

## 2023-02-06 NOTE — CARDIOLOGY SUMMARY
[___] : [unfilled] [de-identified] : 7/27/21 nsr IVCD [de-identified] : August 5, 2021.  EF 36% mild mitral regurgitation normal left atrium moderate global LV systolic dysfunction.  [place] akinetic mild concentric LVH.  Left ventricular enlargement.\par August 22, 2022 PA EF 35 to 40% mild mitral regurgitation LVH.

## 2023-02-06 NOTE — PROCEDURE
[No] : not [NSR] : normal sinus rhythm [See Device Printout] : See device printout [ICD] : Implantable cardioverter-defibrillator [VVI] : VVI [Voltage: ___ volts] : Voltage was [unfilled] volts [Threshold Testing Performed] : Threshold testing was performed [Lead Imp:  ___ohms] : lead impedance was [unfilled] ohms [Sensing Amplitude ___mv] : sensing amplitude was [unfilled] mv [___V @] : [unfilled] V [___ ms] : [unfilled] ms [None] : none [de-identified] : Medtronic [de-identified] : Yvon JOSE VR [id number] [de-identified] : [id number] [de-identified] : 2/11/19 [de-identified] : 7.1 years [de-identified] : 38 [de-identified] : \par Vpaced <0.1%\par \par Shock Therapy\par VF at 200bpm: ATP during charge, 35J x6\par FVT via VF at 231bpm: burst (1), 35J x5\par VT: Off\par \par Discussed home monitoring with the patient and at this time he declines.\par Pt has no new complaints today. \par \par Next ICD interrogation in 3 months as per Dr. Macias with an office visit same day\par \par Sincerely,\par \par Leigh Garibay PA-C\par Patients history, testing and plan reviewed with supervising MD: Dr. Sita Macias

## 2023-02-06 NOTE — REASON FOR VISIT
[Symptom and Test Evaluation] : symptom and test evaluation [Cardiac Failure] : cardiac failure [Arrhythmia/ECG Abnorrmalities] : arrhythmia/ECG abnormalities [Structural Heart and Valve Disease] : structural heart and valve disease [Hyperlipidemia] : hyperlipidemia [Hypertension] : hypertension [FreeTextEntry3] : Dr. Rivera [FreeTextEntry1] : YOLIS MOORE  is a 52 year M  who presents today February 6, 2023 in clinical follow-up. Overall he has been feeling well. No new exertional complaints. Medications have remained unchanged. Recent labs have been reviewed with the patient. No recent illness or hospital stay.  Does not do regular exercises.  Has been using a CPAP regularly.  Unable to lose weight.  \par AICD interrogated today with normal sensing, capture, impedance and battery longevity. \par \par Today he denies chest pain, pressure, unusual shortness of breath, lightheadedness, dizziness, near syncope or syncope. \par \par

## 2023-02-06 NOTE — DISCUSSION/SUMMARY
[FreeTextEntry1] : YOLIS MOORE  is a 52 year M  who presents today February 6, 2023 with the above history and the following active issues. \par \par #1 Nonischemic, dilated cardiomyopathy. His ejection fraction 30-35%.  Class 2 heart failure symptoms without signs of volume overload. Tolerating optimal medical therapy well including full dose entresto, coreg, hydralazine, aldactone, and torsemide. Renal function and electrolytes are stable per recent labs.  BNP level normal.  He denies any new symptoms today.  \par He is using torsemide as needed.  No significant signs of volume overload.\par Repeat labs ordered.\par Tolerating  Farxiga well .  Watch for infection in the groin region as well as urinary symptoms were discussed.  This will also help with his prediabetes.\par Increasing exercise.  Weight reduction was strongly recommended.\par Continue sleep apnea management\par Labs ordered.\par Echocardiogram stable\par #2 AICD. A single lead MDT. Interrogated today. No new recorded events. Continue to follow. Stable battery and lead measurements. He declines remote monitoring. Will cont to check q3 months in office. \par \par #3 hyperlipidemia. lipids very well controlled on simvastatin. cont current dose and lifestyle modification measures.\par \par #4 morbid obesity. Weight reduction is strongly recommended.  Understands relationship with obesity and cardiovascular disease specifically his cardiomyopathy and heart failure\par \par #5 sleep apnea. Obstructive. Continue use of CPAP on a regular basis. \par \par #6 history of mitral insufficiency.  Stable echocardiogram August 22, 2022. No SBE prophylaxis required. \par \par \par Counseling regarding low saturated fat, salt and carbohydrate intake was reviewed. Active lifestyle and regular. Exercise along with weight management is advised.\par All the above were at length reviewed. Answered all the questions. Thank you very much for this kind referral. Please do not hesitate to give me a call for any question.\par Part of this transcription was done with voice recognition software and phonetically similar errors are common. I apologize for that. Please do not hesitate to call for any questions due to above.\par \par Follow-up as advised\par Sincerely,\par Sita Macias MD,FACC,FASE\par

## 2023-04-02 ENCOUNTER — NON-APPOINTMENT (OUTPATIENT)
Age: 53
End: 2023-04-02

## 2023-05-08 ENCOUNTER — APPOINTMENT (OUTPATIENT)
Dept: CARDIOLOGY | Facility: CLINIC | Age: 53
End: 2023-05-08
Payer: MEDICARE

## 2023-05-08 VITALS
SYSTOLIC BLOOD PRESSURE: 100 MMHG | BODY MASS INDEX: 42.95 KG/M2 | DIASTOLIC BLOOD PRESSURE: 62 MMHG | HEART RATE: 79 BPM | WEIGHT: 300 LBS | OXYGEN SATURATION: 97 % | HEIGHT: 70 IN

## 2023-05-08 PROCEDURE — 93282 PRGRMG EVAL IMPLANTABLE DFB: CPT

## 2023-05-08 PROCEDURE — 99214 OFFICE O/P EST MOD 30 MIN: CPT

## 2023-05-08 RX ORDER — POTASSIUM CHLORIDE 1500 MG/1
20 TABLET, EXTENDED RELEASE ORAL
Qty: 90 | Refills: 0 | Status: DISCONTINUED | COMMUNITY
Start: 2023-01-22 | End: 2023-05-08

## 2023-05-08 NOTE — CARDIOLOGY SUMMARY
[___] : [unfilled] [de-identified] : 7/27/21 nsr IVCD [de-identified] : August 5, 2021.  EF 36% mild mitral regurgitation normal left atrium moderate global LV systolic dysfunction.  [place] akinetic mild concentric LVH.  Left ventricular enlargement.\par August 22, 2022 PA EF 35 to 40% mild mitral regurgitation LVH.

## 2023-05-08 NOTE — PROCEDURE
[No] : not [NSR] : normal sinus rhythm [See Device Printout] : See device printout [ICD] : Implantable cardioverter-defibrillator [VVI] : VVI [Voltage: ___ volts] : Voltage was [unfilled] volts [Threshold Testing Performed] : Threshold testing was performed [Lead Imp:  ___ohms] : lead impedance was [unfilled] ohms [Sensing Amplitude ___mv] : sensing amplitude was [unfilled] mv [___V @] : [unfilled] V [___ ms] : [unfilled] ms [None] : none [de-identified] : Medtronic [de-identified] : Yvon JOSE VR [id number] [de-identified] : [id number] [de-identified] : 2/11/19 [de-identified] : 38 [de-identified] : 6.6 years [de-identified] : \par Vpaced <0.1%\par \par Shock Therapy\par VF at 200bpm: ATP during charge, 35J x6\par FVT via VF at 231bpm: burst (1), 35J x5\par VT: Off\par \par Discussed home monitoring with the patient and at this time he declines.\par Pt has no new complaints today. \par \par Next ICD interrogation in 3 months as per Dr. Macias with an office visit same day\par \par Sincerely,\par \par Leigh Garibay PA-C\par Patients history, testing and plan reviewed with supervising MD: Dr. Sita Macias

## 2023-05-08 NOTE — ASSESSMENT
[FreeTextEntry1] : Reviewed on May 8, 2023.\par Most recent labs had shown hemoglobin A1c 6.2\par February 2023 creatinine 1.0 sodium 135 potassium 4.4 N-terminal proBNP 22

## 2023-05-08 NOTE — DISCUSSION/SUMMARY
[FreeTextEntry1] : YOLIS MOORE  is a 52 year M  who presents today  with the above history and the following active issues. \par \par #1 Nonischemic, dilated cardiomyopathy. His ejection fraction 30-35%.  Class 2 heart failure symptoms without signs of volume overload. Tolerating optimal medical therapy well including full dose entresto, coreg, hydralazine, aldactone, Farxiga and torsemide. Renal function and electrolytes are stable per recent labs.  N-terminal pro BNP level normal.  OptiVol on ICD check normal.  He denies any new symptoms today.  \par He is using torsemide as needed.  No significant signs of volume overload.\par Repeat labs ordered.\par Increasing exercise.  Weight reduction was strongly recommended.\par Continue sleep apnea management\par Labs ordered.\par Follow-up echocardiogram\par #2 AICD. A single lead MDT. Interrogated today. No new recorded events. Continue to follow. Stable battery and lead measurements. He declines remote monitoring. Will cont to check q3 months in office. \par \par #3 hyperlipidemia. lipids very well controlled on simvastatin. cont current dose and lifestyle modification measures.\par \par #4 morbid obesity. Weight reduction is strongly recommended.  Understands relationship with obesity and cardiovascular disease specifically his cardiomyopathy and heart failure\par \par #5 sleep apnea. Obstructive. Continue use of CPAP on a regular basis. \par \par #6 history of mitral insufficiency.  Echocardiogram ordered.  No SBE prophylaxis required. \par \par \par Counseling regarding low saturated fat, salt and carbohydrate intake was reviewed. Active lifestyle and regular. Exercise along with weight management is advised.\par All the above were at length reviewed. Answered all the questions. Thank you very much for this kind referral. Please do not hesitate to give me a call for any question.\par Part of this transcription was done with voice recognition software and phonetically similar errors are common. I apologize for that. Please do not hesitate to call for any questions due to above.\par \par Sincerely,\par Sita Macias MD,FACC,[place]\par

## 2023-05-08 NOTE — REASON FOR VISIT
[Symptom and Test Evaluation] : symptom and test evaluation [Cardiac Failure] : cardiac failure [Arrhythmia/ECG Abnorrmalities] : arrhythmia/ECG abnormalities [Structural Heart and Valve Disease] : structural heart and valve disease [Hyperlipidemia] : hyperlipidemia [Hypertension] : hypertension [FreeTextEntry3] : Dr. Rivera [FreeTextEntry1] : YOLIS MOORE  is a 52 year M  who presents today in clinical follow-up. Overall he has been feeling well. No new exertional complaints. Medications have remained unchanged. Recent labs have been reviewed with the patient. No recent illness or hospital stay.  Does not do regular exercises.  Has been using a CPAP regularly.  Unable to lose weight.  \par AICD interrogated today with normal sensing, capture, impedance and battery longevity. \par \par Today he denies chest pain, pressure, unusual shortness of breath, lightheadedness, dizziness, near syncope or syncope. \par \par

## 2023-06-19 ENCOUNTER — RX RENEWAL (OUTPATIENT)
Age: 53
End: 2023-06-19

## 2023-06-19 RX ORDER — CARVEDILOL 6.25 MG/1
6.25 TABLET, FILM COATED ORAL
Qty: 180 | Refills: 2 | Status: DISCONTINUED | COMMUNITY
Start: 2021-06-30 | End: 2023-06-19

## 2023-06-19 RX ORDER — POTASSIUM CHLORIDE 1500 MG/1
20 TABLET, EXTENDED RELEASE ORAL DAILY
Qty: 90 | Refills: 3 | Status: ACTIVE | COMMUNITY
Start: 1900-01-01 | End: 1900-01-01

## 2023-06-19 RX ORDER — CARVEDILOL 25 MG/1
25 TABLET, FILM COATED ORAL TWICE DAILY
Qty: 180 | Refills: 3 | Status: DISCONTINUED | COMMUNITY
Start: 2019-01-14 | End: 2023-06-19

## 2023-06-19 RX ORDER — SACUBITRIL AND VALSARTAN 97; 103 MG/1; MG/1
97-103 TABLET, FILM COATED ORAL
Qty: 180 | Refills: 3 | Status: ACTIVE | COMMUNITY
Start: 2023-01-22 | End: 1900-01-01

## 2023-09-10 ENCOUNTER — NON-APPOINTMENT (OUTPATIENT)
Age: 53
End: 2023-09-10

## 2023-09-11 ENCOUNTER — APPOINTMENT (OUTPATIENT)
Dept: CARDIOLOGY | Facility: CLINIC | Age: 53
End: 2023-09-11
Payer: MEDICARE

## 2023-09-11 VITALS
WEIGHT: 312 LBS | BODY MASS INDEX: 44.67 KG/M2 | DIASTOLIC BLOOD PRESSURE: 66 MMHG | OXYGEN SATURATION: 96 % | HEIGHT: 70 IN | SYSTOLIC BLOOD PRESSURE: 112 MMHG | HEART RATE: 87 BPM

## 2023-09-11 PROCEDURE — 93284 PRGRMG EVAL IMPLANTABLE DFB: CPT

## 2023-09-11 PROCEDURE — 99214 OFFICE O/P EST MOD 30 MIN: CPT

## 2023-09-11 PROCEDURE — 93306 TTE W/DOPPLER COMPLETE: CPT

## 2023-09-13 ENCOUNTER — RX RENEWAL (OUTPATIENT)
Age: 53
End: 2023-09-13

## 2023-09-27 ENCOUNTER — NON-APPOINTMENT (OUTPATIENT)
Age: 53
End: 2023-09-27

## 2023-10-16 ENCOUNTER — APPOINTMENT (OUTPATIENT)
Dept: FAMILY MEDICINE | Facility: CLINIC | Age: 53
End: 2023-10-16
Payer: MEDICARE

## 2023-10-16 VITALS
HEIGHT: 70 IN | DIASTOLIC BLOOD PRESSURE: 70 MMHG | HEART RATE: 88 BPM | RESPIRATION RATE: 16 BRPM | BODY MASS INDEX: 44.09 KG/M2 | WEIGHT: 308 LBS | SYSTOLIC BLOOD PRESSURE: 100 MMHG | OXYGEN SATURATION: 99 % | TEMPERATURE: 97.2 F

## 2023-10-16 DIAGNOSIS — Z12.5 ENCOUNTER FOR SCREENING FOR MALIGNANT NEOPLASM OF PROSTATE: ICD-10-CM

## 2023-10-16 DIAGNOSIS — Z00.00 ENCOUNTER FOR GENERAL ADULT MEDICAL EXAMINATION W/OUT ABNORMAL FINDINGS: ICD-10-CM

## 2023-10-16 DIAGNOSIS — Z13.29 ENCOUNTER FOR SCREENING FOR OTHER SUSPECTED ENDOCRINE DISORDER: ICD-10-CM

## 2023-10-16 DIAGNOSIS — E04.9 NONTOXIC GOITER, UNSPECIFIED: ICD-10-CM

## 2023-10-16 DIAGNOSIS — Z13.228 ENCOUNTER FOR SCREENING FOR OTHER SUSPECTED ENDOCRINE DISORDER: ICD-10-CM

## 2023-10-16 DIAGNOSIS — R73.03 PREDIABETES.: ICD-10-CM

## 2023-10-16 DIAGNOSIS — Z13.0 ENCOUNTER FOR SCREENING FOR OTHER SUSPECTED ENDOCRINE DISORDER: ICD-10-CM

## 2023-10-16 DIAGNOSIS — Z13.21 ENCOUNTER FOR SCREENING FOR OTHER SUSPECTED ENDOCRINE DISORDER: ICD-10-CM

## 2023-10-16 LAB
BILIRUB UR QL STRIP: NEGATIVE
CLARITY UR: CLEAR
COLLECTION METHOD: NORMAL
GLUCOSE UR-MCNC: NORMAL
HCG UR QL: 0.2 EU/DL
HGB UR QL STRIP.AUTO: NEGATIVE
KETONES UR-MCNC: NEGATIVE
LEUKOCYTE ESTERASE UR QL STRIP: NEGATIVE
NITRITE UR QL STRIP: NEGATIVE
PH UR STRIP: 5.5
PROT UR STRIP-MCNC: NEGATIVE
SP GR UR STRIP: 1.01

## 2023-10-16 PROCEDURE — 36415 COLL VENOUS BLD VENIPUNCTURE: CPT

## 2023-10-16 PROCEDURE — G0439: CPT

## 2023-10-16 PROCEDURE — 81003 URINALYSIS AUTO W/O SCOPE: CPT | Mod: QW

## 2023-10-31 LAB
ALBUMIN SERPL ELPH-MCNC: 4.5 G/DL
ALP BLD-CCNC: 74 U/L
ALT SERPL-CCNC: 26 U/L
ANION GAP SERPL CALC-SCNC: 13 MMOL/L
AST SERPL-CCNC: 24 U/L
BASOPHILS # BLD AUTO: 0.05 K/UL
BASOPHILS NFR BLD AUTO: 0.5 %
BILIRUB SERPL-MCNC: 0.4 MG/DL
BUN SERPL-MCNC: 9 MG/DL
CALCIUM SERPL-MCNC: 9.8 MG/DL
CHLORIDE SERPL-SCNC: 103 MMOL/L
CHOLEST SERPL-MCNC: 127 MG/DL
CO2 SERPL-SCNC: 20 MMOL/L
CREAT SERPL-MCNC: 1.08 MG/DL
EGFR: 82 ML/MIN/1.73M2
EOSINOPHIL # BLD AUTO: 0.21 K/UL
EOSINOPHIL NFR BLD AUTO: 2 %
ESTIMATED AVERAGE GLUCOSE: 131 MG/DL
GLUCOSE SERPL-MCNC: 125 MG/DL
HBA1C MFR BLD HPLC: 6.2 %
HCT VFR BLD CALC: 41.2 %
HDLC SERPL-MCNC: 28 MG/DL
HGB BLD-MCNC: 13.1 G/DL
IMM GRANULOCYTES NFR BLD AUTO: 0.4 %
LDLC SERPL CALC-MCNC: 77 MG/DL
LYMPHOCYTES # BLD AUTO: 2.25 K/UL
LYMPHOCYTES NFR BLD AUTO: 21 %
MAN DIFF?: NORMAL
MCHC RBC-ENTMCNC: 27.3 PG
MCHC RBC-ENTMCNC: 31.8 GM/DL
MCV RBC AUTO: 86 FL
MONOCYTES # BLD AUTO: 0.66 K/UL
MONOCYTES NFR BLD AUTO: 6.2 %
NEUTROPHILS # BLD AUTO: 7.51 K/UL
NEUTROPHILS NFR BLD AUTO: 69.9 %
NONHDLC SERPL-MCNC: 99 MG/DL
PLATELET # BLD AUTO: 295 K/UL
POTASSIUM SERPL-SCNC: 4.7 MMOL/L
PROT SERPL-MCNC: 7.5 G/DL
PSA SERPL-MCNC: 0.42 NG/ML
RBC # BLD: 4.79 M/UL
RBC # FLD: 13.2 %
SODIUM SERPL-SCNC: 137 MMOL/L
T3 SERPL-MCNC: 141 NG/DL
T4 SERPL-MCNC: 8.2 UG/DL
TRIGL SERPL-MCNC: 122 MG/DL
TSH SERPL-ACNC: 1.33 UIU/ML
WBC # FLD AUTO: 10.72 K/UL

## 2023-11-08 ENCOUNTER — RX RENEWAL (OUTPATIENT)
Age: 53
End: 2023-11-08

## 2023-11-08 RX ORDER — SIMVASTATIN 10 MG/1
10 TABLET, FILM COATED ORAL
Qty: 90 | Refills: 3 | Status: ACTIVE | COMMUNITY
Start: 2021-07-06 | End: 1900-01-01

## 2023-11-08 RX ORDER — HYDRALAZINE HYDROCHLORIDE 25 MG/1
25 TABLET ORAL
Qty: 180 | Refills: 3 | Status: ACTIVE | COMMUNITY
Start: 2021-06-30 | End: 1900-01-01

## 2024-01-20 ENCOUNTER — RX RENEWAL (OUTPATIENT)
Age: 54
End: 2024-01-20

## 2024-01-22 RX ORDER — CARVEDILOL 6.25 MG/1
6.25 TABLET, FILM COATED ORAL TWICE DAILY
Qty: 180 | Refills: 3 | Status: ACTIVE | COMMUNITY
Start: 2023-06-19 | End: 1900-01-01

## 2024-02-10 NOTE — REASON FOR VISIT
0700 Patient A&Ox4, resting comfortably in bed with eyes closed at this time. Call bell within reach, patient safety maintained.     1300 Patient discharged with AVS at this time.    [New Patient Device Check] : new patient device check visit [HEATHER (Elective Replacement Indication)] : elective replacement indication [Follow-Up - Clinic] : a clinic follow-up of [FreeTextEntry2] : preop single chamber ICD generator change 2/11/19 [facility], CMP LVEF 30-35% echo Jan 2019, NSVT, TAMAR, obesity [FreeTextEntry1] : Jonel is a 48-year-old male with history of dilated cardiomyopathy LVEF 30-35% echo in January 2019, NSVT, PSVT and sinus tach, obstructive sleep apnea, morbid obesity, dyslipidemia,HFrEF, [facility] single-chamber ICD 2010, V-pacing less than 1%.  ICD at HEATHER 12/18/19 scheduled for ICD generator change Dr Valentino 2/11/19, [facility] [place].\par \par Patient has dyspnea with moderate exertion. Cardiovascular review of symptoms is negative for exertional chest pain,  palpitations, dizziness or syncope.  No PND or orthopnea leg edema.  No bleeding or black stool.  No ICD shocks. \par \par Patient is walking 15 minutes without exertional chest pain. \par \par \par \par \par

## 2024-03-04 ENCOUNTER — RX RENEWAL (OUTPATIENT)
Age: 54
End: 2024-03-04

## 2024-03-04 RX ORDER — CARVEDILOL 25 MG/1
25 TABLET, FILM COATED ORAL
Qty: 180 | Refills: 3 | Status: ACTIVE | COMMUNITY
Start: 2023-06-19 | End: 1900-01-01

## 2024-03-18 ENCOUNTER — APPOINTMENT (OUTPATIENT)
Dept: CARDIOLOGY | Facility: CLINIC | Age: 54
End: 2024-03-18

## 2024-03-18 VITALS
OXYGEN SATURATION: 98 % | HEIGHT: 70 IN | DIASTOLIC BLOOD PRESSURE: 62 MMHG | WEIGHT: 315 LBS | BODY MASS INDEX: 45.1 KG/M2 | SYSTOLIC BLOOD PRESSURE: 104 MMHG | HEART RATE: 80 BPM

## 2024-03-18 DIAGNOSIS — Z95.810 PRESENCE OF AUTOMATIC (IMPLANTABLE) CARDIAC DEFIBRILLATOR: ICD-10-CM

## 2024-03-18 DIAGNOSIS — I42.0 DILATED CARDIOMYOPATHY: ICD-10-CM

## 2024-03-18 DIAGNOSIS — I10 ESSENTIAL (PRIMARY) HYPERTENSION: ICD-10-CM

## 2024-03-18 DIAGNOSIS — I50.22 CHRONIC SYSTOLIC (CONGESTIVE) HEART FAILURE: ICD-10-CM

## 2024-03-18 DIAGNOSIS — I34.0 NONRHEUMATIC MITRAL (VALVE) INSUFFICIENCY: ICD-10-CM

## 2024-03-18 DIAGNOSIS — E78.5 HYPERLIPIDEMIA, UNSPECIFIED: ICD-10-CM

## 2024-03-18 PROCEDURE — 99214 OFFICE O/P EST MOD 30 MIN: CPT | Mod: 25

## 2024-03-18 PROCEDURE — 93000 ELECTROCARDIOGRAM COMPLETE: CPT | Mod: XU

## 2024-03-18 PROCEDURE — G2211 COMPLEX E/M VISIT ADD ON: CPT

## 2024-03-18 PROCEDURE — 93282 PRGRMG EVAL IMPLANTABLE DFB: CPT

## 2024-03-18 PROCEDURE — 99214 OFFICE O/P EST MOD 30 MIN: CPT

## 2024-03-18 NOTE — DISCUSSION/SUMMARY
[EKG obtained to assist in diagnosis and management of assessed problem(s)] : EKG obtained to assist in diagnosis and management of assessed problem(s) [FreeTextEntry1] : YOLIS MOORE  is a 53 year M  who presents today  with the above history and the following active issues.   #1 Nonischemic, dilated cardiomyopathy. His ejection fraction 30-35%.  Class 2 heart failure symptoms without signs of volume overload. Tolerating optimal medical therapy well including full dose entresto, coreg, hydralazine, aldactone, Farxiga and torsemide. Renal function and electrolytes are stable per recent labs.  N-terminal pro BNP level normal.  OptiVol on ICD check normal.  He denies any new symptoms today.   He is using torsemide as needed.  No significant signs of volume overload. Repeat labs ordered. Increasing exercise.  Weight reduction was strongly recommended. Continue sleep apnea management Labs ordered. f/u echocardiogram. #2 AICD. A single lead MDT. Interrogated today. No new recorded events. Continue to follow. Stable battery and lead measurements. He declines remote monitoring. Will cont to check q3 months in office.   #3 hyperlipidemia. lipids very well controlled on simvastatin. cont current dose and lifestyle modification measures.  #4 morbid obesity. Weight reduction is strongly recommended.  Understands relationship with obesity and cardiovascular disease specifically his cardiomyopathy and heart failure.  Consider Weight loss medications.  #5 sleep apnea. Obstructive. Continue use of CPAP on a regular basis.   #6 history of mitral insufficiency.  Echocardiogram ordered.  No SBE prophylaxis required.    Counseling regarding low saturated fat, salt and carbohydrate intake was reviewed. Active lifestyle and regular. Exercise along with weight management is advised. All the above were at length reviewed. Answered all the questions. Thank you very much for this kind referral. Please do not hesitate to give me a call for any question. Part of this transcription was done with voice recognition software and phonetically similar errors are common. I apologize for that. Please do not hesitate to call for any questions due to above.  Sincerely, Sita Macias MD,[facility],[place]

## 2024-03-18 NOTE — ASSESSMENT
[FreeTextEntry1] : Reviewed on May 8, 2023. Most recent labs had shown hemoglobin A1c 6.2 February 2023 creatinine 1.0 sodium 135 potassium 4.4 N-terminal proBNP 22  Reviewed on September 11 2323. Recent labs from July had shown stable CMP.  Excellent lipid panel.  N-terminal proBNP less than 50. AICD check today normal functioning. Echocardiogram reviewed.  18 2024.Reviewed March recent labs including CBC CMP lipid panel were reviewed.  EKG from today reviewed.  ICD check from today was reviewed.

## 2024-03-18 NOTE — REASON FOR VISIT
[Symptom and Test Evaluation] : symptom and test evaluation [Arrhythmia/ECG Abnorrmalities] : arrhythmia/ECG abnormalities [Cardiac Failure] : cardiac failure [Structural Heart and Valve Disease] : structural heart and valve disease [Hyperlipidemia] : hyperlipidemia [Hypertension] : hypertension [FreeTextEntry3] : Dr. Rivera [FreeTextEntry1] : YOLIS MOORE  is a 53 year M  who presents today in clinical follow-up. Overall he has been feeling well. No new exertional complaints. Medications have remained unchanged. Recent labs have been reviewed with the patient. No recent illness or hospital stay.  Does not do regular exercises.  Has been using a CPAP regularly.  Unable to lose weight.   AICD interrogated today with normal sensing, capture, impedance and battery longevity.   Today he denies chest pain, pressure, unusual shortness of breath, lightheadedness, dizziness, near syncope or syncope.

## 2024-03-18 NOTE — PHYSICAL EXAM
[Obese] : obese [Normal S1, S2] : normal S1, S2 [Normal Venous Pressure] : normal venous pressure [No Rub] : no rub [No Gallop] : no gallop [Clear Lung Fields] : clear lung fields [Murmur] : murmur [Normal Radial B/L] : normal radial B/L [Normal Gait] : normal gait [Normal Speech] : normal speech [Alert and Oriented] : alert and oriented

## 2024-03-18 NOTE — REVIEW OF SYSTEMS
[Dyspnea on exertion] : dyspnea during exertion [Negative] : Psychiatric [Weight Gain (___ Lbs)] : no recent weight gain [Chest Discomfort] : no chest discomfort [SOB] : no shortness of breath [Lower Ext Edema] : no extremity edema [Leg Claudication] : no intermittent leg claudication [Palpitations] : no palpitations [Orthopnea] : no orthopnea [PND] : no PND [Joint Pain] : no joint pain [Syncope] : no syncope [Joint Stiffness] : no joint stiffness

## 2024-03-18 NOTE — PROCEDURE
[No] : not [NSR] : normal sinus rhythm [See Device Printout] : See device printout [ICD] : Implantable cardioverter-defibrillator [VVI] : VVI [Voltage: ___ volts] : Voltage was [unfilled] volts [Lead Imp:  ___ohms] : lead impedance was [unfilled] ohms [Threshold Testing Performed] : Threshold testing was performed [Sensing Amplitude ___mv] : sensing amplitude was [unfilled] mv [___V @] : [unfilled] V [___ ms] : [unfilled] ms [None] : none [de-identified] : Medtronic [de-identified] : Yvon JOSE VR [id number] [de-identified] : [id number] [de-identified] : 2/11/19 [de-identified] : 38 [de-identified] : 5.8 years [de-identified] : Vpaced <0.1%  Shock Therapy VF at 200bpm: ATP during charge, 35J x6 FVT via VF at 231bpm: burst (1), 35J x5 VT: Off  ALL therapies changed to  B>AX at max output of 35J  Discussed home monitoring with the patient and at this time he declines. Pt has no new complaints today.   Next ICD interrogation in 6 months as per Dr. Macias with an office visit same day  Sincerely,  Leigh Garibay PA-C Patients history, testing and plan reviewed with supervising MD: Dr. Sita Macias

## 2024-03-18 NOTE — HISTORY OF PRESENT ILLNESS
[FreeTextEntry1] : \par  HPI\par  -History of dilated cardiomyopathy, class II functional status, LV end diastolic dimension 6.6. MUGA test earlier in 2014, 42%; ejection fraction by echocardiogram less than 35%, single lead AICD, last MVO2 16 mL/kg/min. It was done by Dr. Lopez. overall dimensions 6.6-7.5 cm.  Last echocardiogram done August 22, 2022 EF 35 to 40% LVIDD 6.9.  Normal left atrium\par  \par  -Single lead Medtronic AICD. S/P gen change Feb 2019. \par  \par  -Nonsustained ventricular tachycardia. Paroxysmal supraventricular tachycardia/sinus tach. On high dose beta blocker. \par  \par  -Mild non rheumatic valvular heart disease, stable.\par  \par  -Obstructive sleep apnea on CPAP.\par  \par  -Obesity, morbid, persistent.\par  \par  -Dyslipidemia, mixed, on statin therapy.\par  \par  -History of Lyme disease. No recurrence.\par

## 2024-03-18 NOTE — CARDIOLOGY SUMMARY
[___] : [unfilled] [de-identified] : 7/27/21 nsr IVCD 3/18/24 nsr  [de-identified] : August 5, 2021.  EF 36% mild mitral regurgitation normal left atrium moderate global LV systolic dysfunction.  [place] akinetic mild concentric LVH.  Left ventricular enlargement.\par  August 22, 2022 PA EF 35 to 40% mild mitral regurgitation LVH.

## 2024-03-27 ENCOUNTER — NON-APPOINTMENT (OUTPATIENT)
Age: 54
End: 2024-03-27

## 2024-03-28 ENCOUNTER — NON-APPOINTMENT (OUTPATIENT)
Age: 54
End: 2024-03-28

## 2024-03-29 ENCOUNTER — NON-APPOINTMENT (OUTPATIENT)
Age: 54
End: 2024-03-29

## 2024-04-25 NOTE — HISTORY OF PRESENT ILLNESS
----- Message from Dianna Pozo sent at 4/25/2024 12:05 PM CDT -----  Contact: Nevaeh  Nevaeh is calling to speak to the nurse regarding her scheduled procedure on 05/02, she is very upset someone please give her a call asap at      Thanks  LJ  
Called patient regarding message to call her asap, no answer left message to return call.  
[FreeTextEntry1] : \par HPI\par •History of dilated cardiomyopathy, class II functional status, LV end diastolic dimension 6.6. MUGA test earlier in 2014, 42%; ejection fraction by echocardiogram less than 35%, single lead AICD, last MVO2 16 mL/kg/min. It was done by Dr. Lopez. overall dimensions 6.6-7.5 cm.  Last echocardiogram done August 22, 2022 EF 35 to 40% LVIDD 6.9.  Normal left atrium\par \par •Single lead Medtronic AICD. S/P gen change Feb 2019. \par \par •Nonsustained ventricular tachycardia. Paroxysmal supraventricular tachycardia/sinus tach. On high dose beta blocker. \par \par •Mild non rheumatic valvular heart disease, stable.\par \par •Obstructive sleep apnea on CPAP.\par \par •Obesity, morbid, persistent.\par \par •Dyslipidemia, mixed, on statin therapy.\par \par •History of Lyme disease. No recurrence.\par

## 2024-05-06 ENCOUNTER — RX RENEWAL (OUTPATIENT)
Age: 54
End: 2024-05-06

## 2024-05-06 RX ORDER — DAPAGLIFLOZIN 5 MG/1
5 TABLET, FILM COATED ORAL
Qty: 90 | Refills: 3 | Status: ACTIVE | COMMUNITY
Start: 2023-09-28 | End: 1900-01-01

## 2024-05-13 NOTE — PROVIDER CONTACT NOTE (OTHER) - REASON
Patient's bed found to have a bedbug Patient's mother called in requesting recommendation from Dr. Graves. Mother reports yesterday morning patient woke up limping on left left. Throughout the day, patient is running with left foot out. Patient denies and pain. Mother states he does not seem any pain. Mother denies bruising/swelling.     Advised patient's mother encounter will be routed to RN to review and offer recommendation. Please advise.

## 2024-06-18 RX ORDER — SPIRONOLACTONE 50 MG/1
50 TABLET ORAL
Qty: 90 | Refills: 3 | Status: ACTIVE | COMMUNITY
Start: 2023-01-22 | End: 1900-01-01

## 2024-07-14 NOTE — H&P PST ADULT - PRO ARRIVE FROM
home administration in time range)   ondansetron (ZOFRAN-ODT) disintegrating tablet 4 mg (has no administration in time range)     Or   ondansetron (ZOFRAN) injection 4 mg (has no administration in time range)   polyethylene glycol (GLYCOLAX) packet 17 g (has no administration in time range)   acetaminophen (TYLENOL) tablet 650 mg (has no administration in time range)     Or   acetaminophen (TYLENOL) suppository 650 mg (has no administration in time range)   potassium chloride (KLOR-CON M) extended release tablet 40 mEq (has no administration in time range)     Or   potassium bicarb-citric acid (EFFER-K) effervescent tablet 40 mEq (has no administration in time range)     Or   potassium chloride 10 mEq/100 mL IVPB (Peripheral Line) (has no administration in time range)   magnesium sulfate 2000 mg in 50 mL IVPB premix (has no administration in time range)   bumetanide (BUMEX) injection 2 mg (2 mg IntraVENous Given 7/14/24 0949)   metOLazone (ZAROXOLYN) tablet 5 mg (5 mg Oral Given 7/14/24 0949)   traMADol (ULTRAM) tablet 50 mg (has no administration in time range)   traMADol (ULTRAM) tablet 100 mg (100 mg Oral Given 7/14/24 0949)   labetalol (NORMODYNE;TRANDATE) injection 10 mg (has no administration in time range)   ipratropium 0.5 mg-albuterol 2.5 mg (DUONEB) nebulizer solution 1 Dose (1 Dose Inhalation Given 7/14/24 0321)   nitroglycerin (NITRO-BID) 2 % ointment 0.5 inch (0.5 inches Topical Given 7/13/24 2229)   furosemide (LASIX) injection 40 mg (40 mg IntraVENous Given 7/13/24 2232)   ibuprofen (ADVIL;MOTRIN) tablet 600 mg (600 mg Oral Given 7/13/24 2228)   methylPREDNISolone sodium succ (SOLU-MEDROL) 125 mg in sterile water 2 mL injection (125 mg IntraVENous Given 7/13/24 2238)   aspirin tablet 325 mg (325 mg Oral Given 7/14/24 0143)   traMADol (ULTRAM) tablet 50 mg (50 mg Oral Given 7/14/24 0218)       CONSULTS: See ED Course/MDM for further details.  IP CONSULT TO HEART FAILURE NURSE/COORDINATOR  IP CONSULT TO

## 2024-08-28 ENCOUNTER — RX RENEWAL (OUTPATIENT)
Age: 54
End: 2024-08-28

## 2024-09-23 ENCOUNTER — APPOINTMENT (OUTPATIENT)
Dept: CARDIOLOGY | Facility: CLINIC | Age: 54
End: 2024-09-23
Payer: MEDICARE

## 2024-09-23 VITALS
SYSTOLIC BLOOD PRESSURE: 90 MMHG | OXYGEN SATURATION: 97 % | WEIGHT: 306 LBS | HEART RATE: 73 BPM | BODY MASS INDEX: 43.91 KG/M2 | DIASTOLIC BLOOD PRESSURE: 64 MMHG

## 2024-09-23 DIAGNOSIS — I34.0 NONRHEUMATIC MITRAL (VALVE) INSUFFICIENCY: ICD-10-CM

## 2024-09-23 DIAGNOSIS — I42.0 DILATED CARDIOMYOPATHY: ICD-10-CM

## 2024-09-23 DIAGNOSIS — I50.22 CHRONIC SYSTOLIC (CONGESTIVE) HEART FAILURE: ICD-10-CM

## 2024-09-23 DIAGNOSIS — Z95.810 PRESENCE OF AUTOMATIC (IMPLANTABLE) CARDIAC DEFIBRILLATOR: ICD-10-CM

## 2024-09-23 DIAGNOSIS — E78.5 HYPERLIPIDEMIA, UNSPECIFIED: ICD-10-CM

## 2024-09-23 PROCEDURE — 93306 TTE W/DOPPLER COMPLETE: CPT

## 2024-09-23 PROCEDURE — G2211 COMPLEX E/M VISIT ADD ON: CPT

## 2024-09-23 PROCEDURE — 93282 PRGRMG EVAL IMPLANTABLE DFB: CPT

## 2024-09-23 PROCEDURE — 99214 OFFICE O/P EST MOD 30 MIN: CPT | Mod: 25

## 2024-09-23 NOTE — PROCEDURE
[No] : not [NSR] : normal sinus rhythm [See Device Printout] : See device printout [ICD] : Implantable cardioverter-defibrillator [VVI] : VVI [Voltage: ___ volts] : Voltage was [unfilled] volts [Threshold Testing Performed] : Threshold testing was performed [___V @] : [unfilled] V [___ ms] : [unfilled] ms [None] : none [Lead Imp:  ___ohms] : lead impedance was [unfilled] ohms [Sensing Amplitude ___mv] : sensing amplitude was [unfilled] mv [de-identified] : Medtronic [de-identified] : Yvon JOSE VR [id number] [de-identified] : [id number] [de-identified] : [birth year] [de-identified] : 38 [de-identified] : 5.1 years [de-identified] : Vpaced <0.1%  Shock Therapy VF at 200bpm: ATP during charge, 35J x6 FVT via VF at 231bpm: burst (1), 35J x5 VT: Off  ALL therapies B>AX at max output of 35J  Discussed home monitoring with the patient and at this time he declines. Pt has no new complaints today.   Next ICD interrogation in 6 months as per Dr. Macias with an office visit same day  Sincerely,  Leigh Garibay PA-C Patients history, testing and plan reviewed with supervising MD: Dr. Sita Macias

## 2024-09-23 NOTE — REASON FOR VISIT
[Symptom and Test Evaluation] : symptom and test evaluation [Cardiac Failure] : cardiac failure [Arrhythmia/ECG Abnorrmalities] : arrhythmia/ECG abnormalities [Structural Heart and Valve Disease] : structural heart and valve disease [Hyperlipidemia] : hyperlipidemia [Hypertension] : hypertension [FreeTextEntry3] : Dr. Rivera [FreeTextEntry1] : YOLIS MOORE  is a 53 year M  who presents today in clinical follow-up. Overall he has been feeling well. No new exertional complaints. Medications have remained unchanged.  Echocardiogram from today was reviewed.  No recent illness or hospital stay.  Does not do regular exercises.  Has been using a CPAP regularly.  Unable to lose weight.   AICD interrogated today with normal sensing, capture, impedance and battery longevity.   Today he denies chest pain, pressure, unusual shortness of breath, lightheadedness, dizziness, near syncope or syncope.

## 2024-09-23 NOTE — CARDIOLOGY SUMMARY
[de-identified] : 7/27/21 nsr IVCD 3/18/24 nsr  [de-identified] : August 5, 2021.  EF 36% mild mitral regurgitation normal left atrium moderate global LV systolic dysfunction.  [facility] akinetic mild concentric LVH.  Left ventricular enlargement. August 22, 2022 EF 35 to 40% mild mitral regurgitation LVH. September 23, 2024 EF around 25 to 30%.  LVE.  Mild LVH. [de-identified] : SUNI 9/23 24  [___] : [unfilled]

## 2024-09-23 NOTE — PROCEDURE
[No] : not [NSR] : normal sinus rhythm [See Device Printout] : See device printout [ICD] : Implantable cardioverter-defibrillator [VVI] : VVI [Voltage: ___ volts] : Voltage was [unfilled] volts [Threshold Testing Performed] : Threshold testing was performed [___V @] : [unfilled] V [___ ms] : [unfilled] ms [None] : none [Lead Imp:  ___ohms] : lead impedance was [unfilled] ohms [Sensing Amplitude ___mv] : sensing amplitude was [unfilled] mv [de-identified] : Medtronic [de-identified] : Yvon JOSE VR [id number] [de-identified] : [id number] [de-identified] : [birth year]11[birth year] [de-identified] : 38 [de-identified] : 5.1 years [de-identified] : Vpaced <0.1%  Shock Therapy VF at 200bpm: ATP during charge, 35J x6 FVT via VF at 231bpm: burst (1), 35J x5 VT: Off  ALL therapies B>AX at max output of 35J  Discussed home monitoring with the patient and at this time he declines. Pt has no new complaints today.   Next ICD interrogation in 6 months as per Dr. Macias with an office visit same day  Sincerely,  Leigh Garibay PA-C Patients history, testing and plan reviewed with supervising MD: Dr. Sita Macias

## 2024-09-23 NOTE — DISCUSSION/SUMMARY
[FreeTextEntry1] : YOLIS MOORE  is a 53 year M  who presents today  with the above history and the following active issues.   #1 Nonischemic, dilated cardiomyopathy. His ejection fraction 30-35%.  Class 2 heart failure symptoms without signs of volume overload. Tolerating optimal medical therapy well including full dose entresto, coreg, hydralazine, aldactone, Farxiga and torsemide. Renal function and electrolytes are stable per recent labs.  N-terminal pro BNP level normal.  OptiVol on ICD check normal.  He denies any new symptoms today.   He is using torsemide as needed.  No significant signs of volume overload. Repeat labs ordered. Increasing exercise.  Weight reduction was strongly recommended. Continue sleep apnea management Labs ordered. relatively stable echocardiogram  #2 AICD. A single lead MDT. Interrogated today. No new recorded events. Continue to follow. Stable battery and lead measurements. He declines remote monitoring. Will cont to check q3 months in office.   #3 hyperlipidemia. lipids very well controlled on simvastatin. cont current dose and lifestyle modification measures.  #4 morbid obesity. Weight reduction is strongly recommended.  Understands relationship with obesity and cardiovascular disease specifically his cardiomyopathy and heart failure.  Consider Weight loss medications.  #5 sleep apnea. Obstructive. Continue use of CPAP on a regular basis.   #6 history of mitral insufficiency.  stable on echo from today.  No SBE prophylaxis required.    Counseling regarding low saturated fat, salt and carbohydrate intake was reviewed. Active lifestyle and regular. Exercise along with weight management is advised. All the above were at length reviewed. Answered all the questions. Thank you very much for this kind referral. Please do not hesitate to give me a call for any question. Part of this transcription was done with voice recognition software and phonetically similar errors are common. I apologize for that. Please do not hesitate to call for any questions due to above.  Sincerely, Sita Macias MD,FACC,GLENN

## 2024-09-23 NOTE — PHYSICAL EXAM
[Obese] : obese [Normal Venous Pressure] : normal venous pressure [Normal S1, S2] : normal S1, S2 [No Rub] : no rub [No Gallop] : no gallop [Murmur] : murmur [Clear Lung Fields] : clear lung fields [Normal Gait] : normal gait [Normal Radial B/L] : normal radial B/L [Normal Speech] : normal speech [Alert and Oriented] : alert and oriented

## 2024-09-23 NOTE — ASSESSMENT
[FreeTextEntry1] : Reviewed on May 8, 2023. Most recent labs had shown hemoglobin A1c 6.2 February 2023 creatinine 1.0 sodium 135 potassium 4.4 N-terminal proBNP 22  Reviewed on September 11 2023. Recent labs from July had shown stable CMP.  Excellent lipid panel.  N-terminal proBNP less than 50. AICD check today normal functioning. Echocardiogram reviewed.  18 2024.Reviewed March recent labs including CBC CMP lipid panel were reviewed.  EKG from today reviewed.  ICD check from today was reviewed.  Reviewed on September 23, 2024.  Echocardiogram from today reviewed. Device check today was reviewed.

## 2024-09-23 NOTE — REVIEW OF SYSTEMS
[Weight Gain (___ Lbs)] : no recent weight gain [SOB] : no shortness of breath [Dyspnea on exertion] : dyspnea during exertion [Chest Discomfort] : no chest discomfort [Lower Ext Edema] : no extremity edema [Leg Claudication] : no intermittent leg claudication [Palpitations] : no palpitations [Orthopnea] : no orthopnea [PND] : no PND [Syncope] : no syncope [Joint Pain] : no joint pain [Joint Stiffness] : no joint stiffness [Negative] : Heme/Lymph

## 2024-10-07 ENCOUNTER — APPOINTMENT (OUTPATIENT)
Dept: FAMILY MEDICINE | Facility: CLINIC | Age: 54
End: 2024-10-07
Payer: MEDICARE

## 2024-10-07 VITALS
OXYGEN SATURATION: 98 % | SYSTOLIC BLOOD PRESSURE: 100 MMHG | HEART RATE: 77 BPM | BODY MASS INDEX: 44.52 KG/M2 | TEMPERATURE: 97.7 F | WEIGHT: 311 LBS | HEIGHT: 70 IN | DIASTOLIC BLOOD PRESSURE: 70 MMHG

## 2024-10-07 DIAGNOSIS — Z12.11 ENCOUNTER FOR SCREENING FOR MALIGNANT NEOPLASM OF COLON: ICD-10-CM

## 2024-10-07 DIAGNOSIS — Z12.12 ENCOUNTER FOR SCREENING FOR MALIGNANT NEOPLASM OF COLON: ICD-10-CM

## 2024-10-07 DIAGNOSIS — I42.0 DILATED CARDIOMYOPATHY: ICD-10-CM

## 2024-10-07 DIAGNOSIS — E78.5 HYPERLIPIDEMIA, UNSPECIFIED: ICD-10-CM

## 2024-10-07 DIAGNOSIS — R73.03 PREDIABETES.: ICD-10-CM

## 2024-10-07 DIAGNOSIS — I47.29 OTHER VENTRICULAR TACHYCARDIA: ICD-10-CM

## 2024-10-07 DIAGNOSIS — I10 ESSENTIAL (PRIMARY) HYPERTENSION: ICD-10-CM

## 2024-10-07 DIAGNOSIS — E66.01 MORBID (SEVERE) OBESITY DUE TO EXCESS CALORIES: ICD-10-CM

## 2024-10-07 PROCEDURE — 99214 OFFICE O/P EST MOD 30 MIN: CPT

## 2024-10-08 ENCOUNTER — OFFICE (OUTPATIENT)
Dept: URBAN - METROPOLITAN AREA CLINIC 103 | Facility: CLINIC | Age: 54
Setting detail: OPHTHALMOLOGY
End: 2024-10-08
Payer: MEDICARE

## 2024-10-08 DIAGNOSIS — H35.40: ICD-10-CM

## 2024-10-08 DIAGNOSIS — H53.001: ICD-10-CM

## 2024-10-08 DIAGNOSIS — H26.493: ICD-10-CM

## 2024-10-08 DIAGNOSIS — H44.23: ICD-10-CM

## 2024-10-08 PROCEDURE — 92004 COMPRE OPH EXAM NEW PT 1/>: CPT | Performed by: OPHTHALMOLOGY

## 2024-10-08 ASSESSMENT — REFRACTION_CURRENTRX
OS_AXIS: 150
OD_SPHERE: +0.50
OD_SPHERE: -18.50
OS_AXIS: 157
OS_OVR_VA: 20/
OD_AXIS: 014
OD_VPRISM_DIRECTION: SV
OD_OVR_VA: 20/
OS_SPHERE: -17.75
OS_VPRISM_DIRECTION: SV
OS_SPHERE: +1.75
OD_CYLINDER: -4.25
OS_VPRISM_DIRECTION: SV
OS_OVR_VA: 20/
OD_OVR_VA: 20/
OD_CYLINDER: -2.00
OS_CYLINDER: -2.50
OS_CYLINDER: -5.00
OD_AXIS: 048
OD_VPRISM_DIRECTION: SV

## 2024-10-08 ASSESSMENT — REFRACTION_MANIFEST
OS_VA1: 20/40
OD_ADD: +2.75
OS_AXIS: 155
OD_CYLINDER: -2.50
OD_CYLINDER: -3.00
OS_SPHERE: +2.25
OD_AXIS: 025
OD_VA1: 20/300
OS_CYLINDER: -3.00
OD_SPHERE: +1.75
OS_ADD: +2.75
OD_AXIS: 150
OD_SPHERE: -0.25

## 2024-10-08 ASSESSMENT — KERATOMETRY
METHOD_AUTO_MANUAL: AUTO
OS_AXISANGLE_DEGREES: 065
OS_K1POWER_DIOPTERS: 42.50
OD_K2POWER_DIOPTERS: 45.25
OD_AXISANGLE_DEGREES: 116
OD_K1POWER_DIOPTERS: 43.00
OS_K2POWER_DIOPTERS: 45.25

## 2024-10-08 ASSESSMENT — CONFRONTATIONAL VISUAL FIELD TEST (CVF)
OS_FINDINGS: FULL
OD_FINDINGS: FULL

## 2024-10-08 ASSESSMENT — REFRACTION_AUTOREFRACTION
OS_SPHERE: +1.75
OS_AXIS: 154
OS_CYLINDER: -2.75
OD_SPHERE: UTP

## 2024-10-08 ASSESSMENT — VISUAL ACUITY
OD_BCVA: 20/60-1
OS_BCVA: 20/400

## 2024-10-08 ASSESSMENT — TONOMETRY
OS_IOP_MMHG: 11
OD_IOP_MMHG: 12

## 2025-01-08 ENCOUNTER — RX ONLY (RX ONLY)
Age: 55
End: 2025-01-08

## 2025-01-08 ENCOUNTER — OFFICE (OUTPATIENT)
Dept: URBAN - METROPOLITAN AREA CLINIC 105 | Facility: CLINIC | Age: 55
Setting detail: OPHTHALMOLOGY
End: 2025-01-08
Payer: MEDICARE

## 2025-01-08 DIAGNOSIS — H26.491: ICD-10-CM

## 2025-01-08 PROBLEM — H26.493 POSTERIOR CAPSULAR OPACIFICATION; RIGHT EYE, LEFT EYE, BOTH EYES: Status: ACTIVE | Noted: 2025-01-08

## 2025-01-08 PROBLEM — H26.492 POSTERIOR CAPSULAR OPACIFICATION; RIGHT EYE, LEFT EYE, BOTH EYES: Status: ACTIVE | Noted: 2025-01-08

## 2025-01-08 PROCEDURE — 66821 AFTER CATARACT LASER SURGERY: CPT | Mod: RT | Performed by: OPHTHALMOLOGY

## 2025-01-08 ASSESSMENT — REFRACTION_CURRENTRX
OS_CYLINDER: -2.50
OS_VPRISM_DIRECTION: SV
OS_AXIS: 157
OD_CYLINDER: -2.00
OD_VPRISM_DIRECTION: SV
OD_VPRISM_DIRECTION: SV
OD_SPHERE: +0.50
OD_AXIS: 014
OS_AXIS: 150
OS_OVR_VA: 20/
OD_SPHERE: -18.50
OD_AXIS: 048
OD_OVR_VA: 20/
OS_CYLINDER: -5.00
OS_VPRISM_DIRECTION: SV
OD_CYLINDER: -4.25
OS_OVR_VA: 20/
OS_SPHERE: -17.75
OD_OVR_VA: 20/
OS_SPHERE: +1.75

## 2025-01-08 ASSESSMENT — KERATOMETRY
OD_K1POWER_DIOPTERS: 43.00
OS_K1POWER_DIOPTERS: 42.50
METHOD_AUTO_MANUAL: AUTO
OD_K2POWER_DIOPTERS: 45.25
OS_K2POWER_DIOPTERS: 45.25
OD_AXISANGLE_DEGREES: 116
OS_AXISANGLE_DEGREES: 065

## 2025-01-08 ASSESSMENT — REFRACTION_AUTOREFRACTION
OD_SPHERE: UTP
OS_AXIS: 154
OS_SPHERE: +1.75
OS_CYLINDER: -2.75

## 2025-01-08 ASSESSMENT — REFRACTION_MANIFEST
OD_ADD: +2.75
OD_AXIS: 150
OD_SPHERE: -0.25
OD_VA1: 20/300
OD_CYLINDER: -3.00
OD_AXIS: 025
OD_SPHERE: +1.75
OS_CYLINDER: -3.00
OS_SPHERE: +2.25
OD_CYLINDER: -2.50
OS_ADD: +2.75
OS_VA1: 20/40
OS_AXIS: 155

## 2025-01-08 ASSESSMENT — CONFRONTATIONAL VISUAL FIELD TEST (CVF)
OD_FINDINGS: FULL
OS_FINDINGS: FULL

## 2025-01-08 ASSESSMENT — VISUAL ACUITY
OD_BCVA: 20/60-1
OS_BCVA: 20/400

## 2025-02-03 ENCOUNTER — OFFICE (OUTPATIENT)
Dept: URBAN - METROPOLITAN AREA CLINIC 105 | Facility: CLINIC | Age: 55
Setting detail: OPHTHALMOLOGY
End: 2025-02-03
Payer: MEDICARE

## 2025-02-03 ENCOUNTER — RX ONLY (RX ONLY)
Age: 55
End: 2025-02-03

## 2025-02-03 DIAGNOSIS — H26.492: ICD-10-CM

## 2025-02-03 PROCEDURE — 66821 AFTER CATARACT LASER SURGERY: CPT | Mod: 79,LT | Performed by: OPHTHALMOLOGY

## 2025-02-03 ASSESSMENT — REFRACTION_AUTOREFRACTION
OD_SPHERE: UTP
OS_AXIS: 154
OS_SPHERE: +1.75
OS_CYLINDER: -2.75

## 2025-02-03 ASSESSMENT — REFRACTION_CURRENTRX
OD_OVR_VA: 20/
OD_SPHERE: -18.50
OS_CYLINDER: -2.50
OS_VPRISM_DIRECTION: SV
OS_OVR_VA: 20/
OS_AXIS: 157
OS_SPHERE: -17.75
OD_VPRISM_DIRECTION: SV
OS_CYLINDER: -5.00
OD_AXIS: 048
OS_AXIS: 150
OD_SPHERE: +0.50
OS_OVR_VA: 20/
OD_CYLINDER: -4.25
OD_CYLINDER: -2.00
OD_VPRISM_DIRECTION: SV
OD_AXIS: 014
OS_VPRISM_DIRECTION: SV
OD_OVR_VA: 20/
OS_SPHERE: +1.75

## 2025-02-03 ASSESSMENT — VISUAL ACUITY
OS_BCVA: 20/400
OD_BCVA: 20/60-1

## 2025-02-03 ASSESSMENT — REFRACTION_MANIFEST
OS_ADD: +2.75
OD_CYLINDER: -2.50
OS_AXIS: 155
OD_AXIS: 150
OD_ADD: +2.75
OD_AXIS: 025
OD_SPHERE: -0.25
OS_SPHERE: +2.25
OD_CYLINDER: -3.00
OD_VA1: 20/300
OS_CYLINDER: -3.00
OS_VA1: 20/40
OD_SPHERE: +1.75

## 2025-02-03 ASSESSMENT — KERATOMETRY
OS_K1POWER_DIOPTERS: 42.50
OD_AXISANGLE_DEGREES: 116
OS_K2POWER_DIOPTERS: 45.25
METHOD_AUTO_MANUAL: AUTO
OD_K2POWER_DIOPTERS: 45.25
OS_AXISANGLE_DEGREES: 065
OD_K1POWER_DIOPTERS: 43.00

## 2025-02-11 NOTE — HEALTH RISK ASSESSMENT
Interval Hx: Patient transferred to cardiac monitoring floor with Cardiology co-management for concerns for cardiogenic shock. He is now on 1.5L of oxygen which is his home prescription. He reports severe fatigue today and reports mild SOB.     Past Surgical History:   Procedure Laterality Date    RADIOFREQUENCY ABLATION  01/08/2008    for atrial flutter       Review of patient's allergies indicates:   Allergen Reactions    Acetaminophen      Itching    Oxycodone-acetaminophen      Other reaction(s): Itching    Ace inhibitors Other (See Comments)     cough     Current Facility-Administered Medications   Medication Frequency    0.9%  NaCl infusion (for blood administration) Q24H PRN    aspirin EC tablet 81 mg Daily    calcium carbonate 200 mg calcium (500 mg) chewable tablet 500 mg TID PRN    diphenhydrAMINE capsule 25 mg Q6H PRN    DOBUTamine 500mg in D5W 250mL infusion (premix) (NON-TITRATING) Continuous    ferrous sulfate EC tablet 325 mg Daily    furosemide (LASIX) 2 mg/mL in sodium chloride 0.9% 100 mL infusion (conc: 2 mg/mL) Continuous    furosemide injection 80 mg Once    gabapentin 250 mg/5 mL (5 mL) solution 100 mg Q12H    isosorbide-hydrALAZINE 20-37.5 mg per tablet 1 tablet TID    melatonin tablet 6 mg Nightly PRN    methyl salicylate-menthol 15-10% cream QID PRN    mirtazapine tablet 7.5 mg QHS    pantoprazole EC tablet 40 mg Daily    polyethylene glycol packet 17 g BID    senna-docusate 8.6-50 mg per tablet 1 tablet BID    sodium chloride 0.9% flush 10 mL PRN    traMADol tablet 50 mg Q6H PRN    warfarin tablet 8 mg Daily     Family History     Problem Relation (Age of Onset)    Alcohol abuse Father    Hypertension Mother, Father, Sister, Brother    Stroke Mother        Tobacco Use    Smoking status: Never Smoker    Smokeless tobacco: Never Used   Substance and Sexual Activity    Alcohol use: No    Drug use: No    Sexual activity: Never     Review of Systems   Constitutional:  Negative for chills, fatigue, fever and unexpected weight change.   HENT: Negative for congestion, postnasal drip and rhinorrhea.    Respiratory: Positive for shortness of breath. Negative for cough, chest tightness and wheezing.    Cardiovascular: Negative for chest pain, palpitations and leg swelling.   Gastrointestinal: Negative for abdominal distention, abdominal pain, constipation, diarrhea, nausea and vomiting.   Endocrine: Negative for polydipsia, polyphagia and polyuria.   Genitourinary: Negative for decreased urine volume, difficulty urinating, dysuria, flank pain, frequency and hematuria.   Musculoskeletal: Negative for arthralgias and back pain.   Skin: Negative for color change, pallor and wound.   Neurological: Negative for dizziness, tremors, facial asymmetry, speech difficulty, weakness, light-headedness, numbness and headaches.   Hematological: Negative for adenopathy. Does not bruise/bleed easily.   Psychiatric/Behavioral: Negative for agitation, behavioral problems, confusion and decreased concentration.     Objective:     Vital Signs (Most Recent):  Temp: 97.5 °F (36.4 °C) (11/27/19 1255)  Pulse: 89 (11/27/19 1255)  Resp: 15 (11/27/19 1255)  BP: (!) 149/64 (11/27/19 1255)  SpO2: 100 % (11/27/19 1255)  O2 Device (Oxygen Therapy): nasal cannula (11/27/19 1255) Vital Signs (24h Range):  Temp:  [97.5 °F (36.4 °C)-98.3 °F (36.8 °C)] 97.5 °F (36.4 °C)  Pulse:  [62-94] 89  Resp:  [15-20] 15  SpO2:  [90 %-100 %] 100 %  BP: (100-149)/(53-71) 149/64     Weight: 123.5 kg (272 lb 4.3 oz) (11/27/19 0828)  Body mass index is 40.21 kg/m².  Body surface area is 2.45 meters squared.    I/O last 3 completed shifts:  In: 870 [P.O.:870]  Out: 750 [Urine:750]    Physical Exam   Constitutional: He is oriented to person, place, and time. He appears well-developed and well-nourished.   HENT:   Head: Normocephalic and atraumatic.   Eyes: Pupils are equal, round, and reactive to light. EOM are normal.   Neck: Normal range  of motion. Neck supple. No JVD present.   Cardiovascular: Normal rate, regular rhythm, normal heart sounds and intact distal pulses. Exam reveals no gallop and no friction rub.   No murmur heard.  Pulmonary/Chest: Effort normal and breath sounds normal. No stridor. No respiratory distress. He has no wheezes.   Abdominal: Soft. Bowel sounds are normal. He exhibits distension. There is no tenderness. There is no guarding.   Musculoskeletal: Normal range of motion.   Neurological: He is alert and oriented to person, place, and time. No cranial nerve deficit or sensory deficit. Coordination normal.   Skin: Skin is dry.   Blistering of BLEs with pitting edema   Psychiatric: He has a normal mood and affect. His behavior is normal. Judgment and thought content normal.       Significant Labs:  BMP:   Recent Labs   Lab 11/27/19  0334   GLU 96   CL 99   CO2 25   BUN 74*   CREATININE 3.7*   CALCIUM 9.1   MG 2.5     CBC:   Recent Labs   Lab 11/27/19  0334   WBC 5.46   RBC 2.82*   HGB 6.0*   HCT 21.2*      MCV 75*   MCH 21.3*   MCHC 28.3*     CMP:   Recent Labs   Lab 11/24/19  1539  11/27/19  0334   GLU 86   < > 96   CALCIUM 9.2   < > 9.1   ALBUMIN 3.5  --   --    PROT 8.3  --   --       < > 137   K 4.1   < > 4.7   CO2 24   < > 25      < > 99   BUN 53*   < > 74*   CREATININE 1.9*   < > 3.7*   ALKPHOS 189*  --   --    ALT 11  --   --    AST 21  --   --    BILITOT 2.0*  --   --     < > = values in this interval not displayed.     No results for input(s): COLORU, CLARITYU, SPECGRAV, PHUR, PROTEINUA, GLUCOSEU, BILIRUBINCON, BLOODU, WBCU, RBCU, BACTERIA, MUCUS, NITRITE, LEUKOCYTESUR, UROBILINOGEN, HYALINECASTS in the last 168 hours.  All labs within the past 24 hours have been reviewed.    Significant Imaging:  Labs: Reviewed   [Good] : ~his/her~ current health as good [Excellent] : ~his/her~  mood as  excellent [No] : No [Never (0 pts)] : Never (0 points) [No falls in past year] : Patient reported no falls in the past year [0] : 1) Little interest or pleasure doing things: Not at all (0) [PHQ-2 Negative - No further assessment needed] : PHQ-2 Negative - No further assessment needed [FreeTextEntry1] : Establishment of medical care [] : No [de-identified] : Walking [de-identified] : Cooks for himself [[id number]] : 0 [Change in mental status noted] : No change in mental status noted High Dose Vitamin A Pregnancy And Lactation Text: High dose vitamin A therapy is contraindicated during pregnancy and breast feeding. Litfulo Counseling: I discussed with the patient the risks of Litfulo therapy including but not limited to upper respiratory tract infections, shingles, cold sores, and nausea. Live vaccines should be avoided.  This medication has been linked to serious infections; higher rate of mortality; malignancy and lymphoproliferative disorders; major adverse cardiovascular events; thrombosis; gastrointestinal perforations; neutropenia; lymphopenia; anemia; liver enzyme elevations; and lipid elevations. Carac Counseling:  I discussed with the patient the risks of Carac including but not limited to erythema, scaling, itching, weeping, crusting, and pain. Oxybutynin Counseling:  I discussed with the patient the risks of oxybutynin including but not limited to skin rash, drowsiness, dry mouth, difficulty urinating, and blurred vision. Metronidazole Pregnancy And Lactation Text: This medication is Pregnancy Category B and considered safe during pregnancy.  It is also excreted in breast milk. Simlandi Pregnancy And Lactation Text: This medication is Pregnancy Category B and is considered safe during pregnancy. It is unknown if this medication is excreted in breast milk. Gabapentin Counseling: I discussed with the patient the risks of gabapentin including but not limited to dizziness, somnolence, fatigue and ataxia. Picato Counseling:  I discussed with the patient the risks of Picato including but not limited to erythema, scaling, itching, weeping, crusting, and pain. Zepbound Counseling: I reviewed the possible side effects including: thyroid tumors, kidney disease, gallbladder disease, abdominal pain, constipation, diarrhea, nausea, vomiting and pancreatitis. Do not take this medication if you have a history or family history of multiple endocrine neoplasia syndrome type 2. Side effects reviewed, pt to contact office should one occur. Doxepin Pregnancy And Lactation Text: This medication is Pregnancy Category C and it isn't known if it is safe during pregnancy. It is also excreted in breast milk and breast feeding isn't recommended. Drysol Counseling:  I discussed with the patient the risks of drysol/aluminum chloride including but not limited to skin rash, itching, irritation, burning. Sotyktu Pregnancy And Lactation Text: There is insufficient data to evaluate whether or not Sotyktu is safe to use during pregnancy.? ?It is not known if Sotyktu passes into breast milk and whether or not it is safe to use when breastfeeding.?? Cyclosporine Counseling:  I discussed with the patient the risks of cyclosporine including but not limited to hypertension, gingival hyperplasia,myelosuppression, immunosuppression, liver damage, kidney damage, neurotoxicity, lymphoma, and serious infections. The patient understands that monitoring is required including baseline blood pressure, CBC, CMP, lipid panel and uric acid, and then 1-2 times monthly CMP and blood pressure. Topical Ketoconazole Counseling: Patient counseled that this medication may cause skin irritation or allergic reactions.  In the event of skin irritation, the patient was advised to reduce the amount of the drug applied or use it less frequently.   The patient verbalized understanding of the proper use and possible adverse effects of ketoconazole.  All of the patient's questions and concerns were addressed. Spironolactone Counseling: Patient advised regarding risks of diarrhea, abdominal pain, hyperkalemia, birth defects (for female patients), liver toxicity and renal toxicity. The patient may need blood work to monitor liver and kidney function and potassium levels while on therapy. The patient verbalized understanding of the proper use and possible adverse effects of spironolactone.  All of the patient's questions and concerns were addressed. Carac Pregnancy And Lactation Text: This medication is Pregnancy Category X and contraindicated in pregnancy and in women who may become pregnant. It is unknown if this medication is excreted in breast milk. Ebglyss Pregnancy And Lactation Text: This medication likely crosses the placenta but the risk for the fetus is uncertain. It is unknown if this medication is excreted in breast milk. Litfulo Pregnancy And Lactation Text: Based on animal studies, Lifulo may cause embryo-fetal harm when administered to pregnant women.  The medication should not be used in pregnancy.  Breastfeeding is not recommended during treatment. Gabapentin Pregnancy And Lactation Text: This medication is Pregnancy Category C and isn't considered safe during pregnancy. It is excreted in breast milk. Drysol Pregnancy And Lactation Text: This medication is considered safe during pregnancy and breast feeding. Hydroxyzine Counseling: Patient advised that the medication is sedating and not to drive a car after taking this medication.  Patient informed of potential adverse effects including but not limited to dry mouth, urinary retention, and blurry vision.  The patient verbalized understanding of the proper use and possible adverse effects of hydroxyzine.  All of the patient's questions and concerns were addressed. Cyclosporine Pregnancy And Lactation Text: This medication is Pregnancy Category C and it isn't know if it is safe during pregnancy. This medication is excreted in breast milk. Picato Pregnancy And Lactation Text: This medication is Pregnancy Category C. It is unknown if this medication is excreted in breast milk. Zepbound Pregnancy And Lactation Text: The fetal risk of this medication is unknown and taking while pregnant is not recommended. It is unknown if this medication is present in breast milk. Topical Ketoconazole Pregnancy And Lactation Text: This medication is Pregnancy Category B and is considered safe during pregnancy. It is unknown if it is excreted in breast milk. Minocycline Counseling: Patient advised regarding possible photosensitivity and discoloration of the teeth, skin, lips, tongue and gums.  Patient instructed to avoid sunlight, if possible.  When exposed to sunlight, patients should wear protective clothing, sunglasses, and sunscreen.  The patient was instructed to call the office immediately if the following severe adverse effects occur:  hearing changes, easy bruising/bleeding, severe headache, or vision changes.  The patient verbalized understanding of the proper use and possible adverse effects of minocycline.  All of the patient's questions and concerns were addressed. Simponi Counseling:  I discussed with the patient the risks of golimumab including but not limited to myelosuppression, immunosuppression, autoimmune hepatitis, demyelinating diseases, lymphoma, and serious infections.  The patient understands that monitoring is required including a PPD at baseline and must alert us or the primary physician if symptoms of infection or other concerning signs are noted. Enbrel Counseling:  I discussed with the patient the risks of etanercept including but not limited to myelosuppression, immunosuppression, autoimmune hepatitis, demyelinating diseases, lymphoma, and infections.  The patient understands that monitoring is required including a PPD at baseline and must alert us or the primary physician if symptoms of infection or other concerning signs are noted. Spironolactone Pregnancy And Lactation Text: This medication can cause feminization of the male fetus and should be avoided during pregnancy. The active metabolite is also found in breast milk. Olumiant Counseling: I discussed with the patient the risks of Olumiant therapy including but not limited to upper respiratory tract infections, shingles, cold sores, and nausea. Live vaccines should be avoided.  This medication has been linked to serious infections; higher rate of mortality; malignancy and lymphoproliferative disorders; major adverse cardiovascular events; thrombosis; gastrointestinal perforations; neutropenia; lymphopenia; anemia; liver enzyme elevations; and lipid elevations. Hydroxyzine Pregnancy And Lactation Text: This medication is not safe during pregnancy and should not be taken. It is also excreted in breast milk and breast feeding isn't recommended. Calcipotriene Counseling:  I discussed with the patient the risks of calcipotriene including but not limited to erythema, scaling, itching, and irritation. Simponi Pregnancy And Lactation Text: The risk during pregnancy and breastfeeding is uncertain with this medication. Minocycline Pregnancy And Lactation Text: This medication is Pregnancy Category D and not consider safe during pregnancy. It is also excreted in breast milk. Elidel Counseling: Patient may experience a mild burning sensation during topical application. Elidel is not approved in children less than 2 years of age. There have been case reports of hematologic and skin malignancies in patients using topical calcineurin inhibitors although causality is questionable. Propranolol Counseling:  I discussed with the patient the risks of propranolol including but not limited to low heart rate, low blood pressure, low blood sugar, restlessness and increased cold sensitivity. They should call the office if they experience any of these side effects. Methotrexate Counseling:  Patient counseled regarding adverse effects of methotrexate including but not limited to nausea, vomiting, abnormalities in liver function tests. Patients may develop mouth sores, rash, diarrhea, and abnormalities in blood counts. The patient understands that monitoring is required including LFT's and blood counts.  There is a rare possibility of scarring of the liver and lung problems that can occur when taking methotrexate. Persistent nausea, loss of appetite, pale stools, dark urine, cough, and shortness of breath should be reported immediately. Patient advised to discontinue methotrexate treatment at least three months before attempting to become pregnant.  I discussed the need for folate supplements while taking methotrexate.  These supplements can decrease side effects during methotrexate treatment. The patient verbalized understanding of the proper use and possible adverse effects of methotrexate.  All of the patient's questions and concerns were addressed. Glycopyrrolate Counseling:  I discussed with the patient the risks of glycopyrrolate including but not limited to skin rash, drowsiness, dry mouth, difficulty urinating, and blurred vision. Fluconazole Counseling:  Patient counseled regarding adverse effects of fluconazole including but not limited to headache, diarrhea, nausea, upset stomach, liver function test abnormalities, taste disturbance, and stomach pain.  There is a rare possibility of liver failure that can occur when taking fluconazole.  The patient understands that monitoring of LFTs and kidney function test may be required, especially at baseline. The patient verbalized understanding of the proper use and possible adverse effects of fluconazole.  All of the patient's questions and concerns were addressed. Protopic Counseling: Patient may experience a mild burning sensation during topical application. Protopic is not approved in children less than 2 years of age. There have been case reports of hematologic and skin malignancies in patients using topical calcineurin inhibitors although causality is questionable. Topical Metronidazole Counseling: Metronidazole is a topical antibiotic medication. You may experience burning, stinging, redness, or allergic reactions.  Please call our office if you develop any problems from using this medication. Olumiant Pregnancy And Lactation Text: Based on animal studies, Olumiant may cause embryo-fetal harm when administered to pregnant women.  The medication should not be used in pregnancy.  Breastfeeding is not recommended during treatment. Skyrizi Counseling: I discussed with the patient the risks of risankizumab-rzaa including but not limited to immunosuppression, and serious infections.  The patient understands that monitoring is required including a PPD at baseline and must alert us or the primary physician if symptoms of infection or other concerning signs are noted. Glycopyrrolate Pregnancy And Lactation Text: This medication is Pregnancy Category B and is considered safe during pregnancy. It is unknown if it is excreted breast milk. Propranolol Pregnancy And Lactation Text: This medication is Pregnancy Category C and it isn't known if it is safe during pregnancy. It is excreted in breast milk. Topical Metronidazole Pregnancy And Lactation Text: This medication is Pregnancy Category B and considered safe during pregnancy.  It is also considered safe to use while breastfeeding. Quinolones Counseling:  I discussed with the patient the risks of fluoroquinolones including but not limited to GI upset, allergic reaction, drug rash, diarrhea, dizziness, photosensitivity, yeast infections, liver function test abnormalities, tendonitis/tendon rupture. Arava Counseling:  Patient counseled regarding adverse effects of Arava including but not limited to nausea, vomiting, abnormalities in liver function tests. Patients may develop mouth sores, rash, diarrhea, and abnormalities in blood counts. The patient understands that monitoring is required including LFTs and blood counts.  There is a rare possibility of scarring of the liver and lung problems that can occur when taking methotrexate. Persistent nausea, loss of appetite, pale stools, dark urine, cough, and shortness of breath should be reported immediately. Patient advised to discontinue Arava treatment and consult with a physician prior to attempting conception. The patient will have to undergo a treatment to eliminate Arava from the body prior to conception. Methotrexate Pregnancy And Lactation Text: This medication is Pregnancy Category X and is known to cause fetal harm. This medication is excreted in breast milk. Protopic Pregnancy And Lactation Text: This medication is Pregnancy Category C. It is unknown if this medication is excreted in breast milk when applied topically. Humira Counseling:  I discussed with the patient the risks of adalimumab including but not limited to myelosuppression, immunosuppression, autoimmune hepatitis, demyelinating diseases, lymphoma, and serious infections.  The patient understands that monitoring is required including a PPD at baseline and must alert us or the primary physician if symptoms of infection or other concerning signs are noted. Fluconazole Pregnancy And Lactation Text: This medication is Pregnancy Category C and it isn't know if it is safe during pregnancy. It is also excreted in breast milk. Rinvoq Counseling: I discussed with the patient the risks of Rinvoq therapy including but not limited to upper respiratory tract infections, shingles, cold sores, bronchitis, nausea, cough, fever, acne, and headache. Live vaccines should be avoided.  This medication has been linked to serious infections; higher rate of mortality; malignancy and lymphoproliferative disorders; major adverse cardiovascular events; thrombosis; thrombocytopenia, anemia, and neutropenia; lipid elevations; liver enzyme elevations; and gastrointestinal perforations. Hydroxychloroquine Counseling:  I discussed with the patient that a baseline ophthalmologic exam is needed at the start of therapy and every year thereafter while on therapy. A CBC may also be warranted for monitoring.  The side effects of this medication were discussed with the patient, including but not limited to agranulocytosis, aplastic anemia, seizures, rashes, retinopathy, and liver toxicity. Patient instructed to call the office should any adverse effect occur.  The patient verbalized understanding of the proper use and possible adverse effects of Plaquenil.  All the patient's questions and concerns were addressed. Qbrexza Counseling:  I discussed with the patient the risks of Qbrexza including but not limited to headache, mydriasis, blurred vision, dry eyes, nasal dryness, dry mouth, dry throat, dry skin, urinary hesitation, and constipation.  Local skin reactions including erythema, burning, stinging, and itching can also occur. Eucrisa Counseling: Patient may experience a mild burning sensation during topical application. Eucrisa is not approved in children less than 2 years of age. SSKI Counseling:  I discussed with the patient the risks of SSKI including but not limited to thyroid abnormalities, metallic taste, GI upset, fever, headache, acne, arthralgias, paraesthesias, lymphadenopathy, easy bleeding, arrhythmias, and allergic reaction. Rituxan Counseling:  I discussed with the patient the risks of Rituxan infusions. Side effects can include infusion reactions, severe drug rashes including mucocutaneous reactions, reactivation of latent hepatitis and other infections and rarely progressive multifocal leukoencephalopathy.  All of the patient's questions and concerns were addressed. Albendazole Pregnancy And Lactation Text: This medication is Pregnancy Category C and it isn't known if it is safe during pregnancy. It is also excreted in breast milk. Doxycycline Counseling:  Patient counseled regarding possible photosensitivity and increased risk for sunburn.  Patient instructed to avoid sunlight, if possible.  When exposed to sunlight, patients should wear protective clothing, sunglasses, and sunscreen.  The patient was instructed to call the office immediately if the following severe adverse effects occur:  hearing changes, easy bruising/bleeding, severe headache, or vision changes.  The patient verbalized understanding of the proper use and possible adverse effects of doxycycline.  All of the patient's questions and concerns were addressed. Vtama Pregnancy And Lactation Text: It is unknown if this medication can cause problems during pregnancy and breastfeeding. Finasteride Male Counseling: Finasteride Counseling:  I discussed with the patient the risks of use of finasteride including but not limited to decreased libido, decreased ejaculate volume, gynecomastia, and depression. Women should not handle medication.  All of the patient's questions and concerns were addressed. Cosentyx Counseling:  I discussed with the patient the risks of Cosentyx including but not limited to worsening of Crohn's disease, immunosuppression, allergic reactions and infections.  The patient understands that monitoring is required including a PPD at baseline and must alert us or the primary physician if symptoms of infection or other concerning signs are noted. Xolair Pregnancy And Lactation Text: This medication is Pregnancy Category B and is considered safe during pregnancy. This medication is excreted in breast milk. Bexarotene Pregnancy And Lactation Text: This medication is Pregnancy Category X and should not be given to women who are pregnant or may become pregnant. This medication should not be used if you are breast feeding. Azelaic Acid Counseling: Patient counseled that medicine may cause skin irritation and to avoid applying near the eyes.  In the event of skin irritation, the patient was advised to reduce the amount of the drug applied or use it less frequently.   The patient verbalized understanding of the proper use and possible adverse effects of azelaic acid.  All of the patient's questions and concerns were addressed. Oral Minoxidil Counseling- I discussed with the patient the risks of oral minoxidil including but not limited to shortness of breath, swelling of the feet or ankles, dizziness, lightheadedness, unwanted hair growth and allergic reaction.  The patient verbalized understanding of the proper use and possible adverse effects of oral minoxidil.  All of the patient's questions and concerns were addressed. Mirvaso Counseling: Mirvaso is a topical medication which can decrease superficial blood flow where applied. Side effects are uncommon and include stinging, redness and allergic reactions. Doxycycline Pregnancy And Lactation Text: This medication is Pregnancy Category D and not consider safe during pregnancy. It is also excreted in breast milk but is considered safe for shorter treatment courses. Cellcept Counseling:  I discussed with the patient the risks of mycophenolate mofetil including but not limited to infection/immunosuppression, GI upset, hypokalemia, hypercholesterolemia, bone marrow suppression, lymphoproliferative disorders, malignancy, GI ulceration/bleed/perforation, colitis, interstitial lung disease, kidney failure, progressive multifocal leukoencephalopathy, and birth defects.  The patient understands that monitoring is required including a baseline creatinine and regular CBC testing. In addition, patient must alert us immediately if symptoms of infection or other concerning signs are noted. Zoryve Counseling:  I discussed with the patient that Zoryve is not for use in the eyes, mouth or vagina. The most commonly reported side effects include diarrhea, headache, insomnia, application site pain, upper respiratory tract infections, and urinary tract infections.  All of the patient's questions and concerns were addressed. Rituxan Pregnancy And Lactation Text: This medication is Pregnancy Category C and it isn't know if it is safe during pregnancy. It is unknown if this medication is excreted in breast milk but similar antibodies are known to be excreted. Colchicine Counseling:  Patient counseled regarding adverse effects including but not limited to stomach upset (nausea, vomiting, stomach pain, or diarrhea).  Patient instructed to limit alcohol consumption while taking this medication.  Colchicine may reduce blood counts especially with prolonged use.  The patient understands that monitoring of kidney function and blood counts may be required, especially at baseline. The patient verbalized understanding of the proper use and possible adverse effects of colchicine.  All of the patient's questions and concerns were addressed. Semaglutide Counseling: I reviewed the possible side effects including: thyroid tumors, kidney disease, gallbladder disease, abdominal pain, constipation, diarrhea, nausea, vomiting and pancreatitis. Do not take this medication if you have a history or family history of multiple endocrine neoplasia syndrome type 2. Side effects reviewed, pt to contact office should one occur. Calcipotriene Pregnancy And Lactation Text: The use of this medication during pregnancy or lactation is not recommended as there is insufficient data. Finasteride Female Counseling: Finasteride Counseling:  I discussed with the patient the risks of use of finasteride including but not limited to decreased libido and sexual dysfunction. Explained the teratogenic nature of the medication and stressed the importance of not getting pregnant during treatment. All of the patient's questions and concerns were addressed. Tazorac Counseling:  Patient advised that medication is irritating and drying.  Patient may need to apply sparingly and wash off after an hour before eventually leaving it on overnight.  The patient verbalized understanding of the proper use and possible adverse effects of tazorac.  All of the patient's questions and concerns were addressed. Ivermectin Counseling:  Patient instructed to take medication on an empty stomach with a full glass of water.  Patient informed of potential adverse effects including but not limited to nausea, diarrhea, dizziness, itching, and swelling of the extremities or lymph nodes.  The patient verbalized understanding of the proper use and possible adverse effects of ivermectin.  All of the patient's questions and concerns were addressed. Cantharidin Counseling:  I discussed with the patient the risks of Cantharidin including but not limited to pain, redness, burning, itching, and blistering. Isotretinoin Counseling: Patient should get monthly blood tests, not donate blood, not drive at night if vision affected, not share medication, and not undergo elective surgery for 6 months after tx completed. Side effects reviewed, pt to contact office should one occur. Oral Minoxidil Pregnancy And Lactation Text: This medication should only be used when clearly needed if you are pregnant, attempting to become pregnant or breast feeding. Cimetidine Counseling:  I discussed with the patient the risks of Cimetidine including but not limited to gynecomastia, headache, diarrhea, nausea, drowsiness, arrhythmias, pancreatitis, skin rashes, psychosis, bone marrow suppression and kidney toxicity. Cellcept Pregnancy And Lactation Text: This medication is Pregnancy Category D and isn't considered safe during pregnancy. It is unknown if this medication is excreted in breast milk. Erythromycin Counseling:  I discussed with the patient the risks of erythromycin including but not limited to GI upset, allergic reaction, drug rash, diarrhea, increase in liver enzymes, and yeast infections. Siliq Counseling:  I discussed with the patient the risks of Siliq including but not limited to new or worsening depression, suicidal thoughts and behavior, immunosuppression, malignancy, posterior leukoencephalopathy syndrome, and serious infections.  The patient understands that monitoring is required including a PPD at baseline and must alert us or the primary physician if symptoms of infection or other concerning signs are noted. There is also a special program designed to monitor depression which is required with Siliq. Mirvaso Pregnancy And Lactation Text: This medication has not been assigned a Pregnancy Risk Category. It is unknown if the medication is excreted in breast milk. Tazorac Pregnancy And Lactation Text: This medication is not safe during pregnancy. It is unknown if this medication is excreted in breast milk. Finasteride Pregnancy And Lactation Text: This medication is absolutely contraindicated during pregnancy. It is unknown if it is excreted in breast milk. Dupixent Counseling: I discussed with the patient the risks of dupilumab including but not limited to eye infection and irritation, cold sores, injection site reactions, worsening of asthma, allergic reactions and increased risk of parasitic infection.  Live vaccines should be avoided while taking dupilumab. Dupilumab will also interact with certain medications such as warfarin and cyclosporine. The patient understands that monitoring is required and they must alert us or the primary physician if symptoms of infection or other concerning signs are noted. Isotretinoin Pregnancy And Lactation Text: This medication is Pregnancy Category X and is considered extremely dangerous during pregnancy. It is unknown if it is excreted in breast milk. Cimetidine Pregnancy And Lactation Text: This medication is Pregnancy Category B and is considered safe during pregnancy. It is also excreted in breast milk and breast feeding isn't recommended. Cibinqo Counseling: I discussed with the patient the risks of Cibinqo therapy including but not limited to common cold, nausea, headache, cold sores, increased blood CPK levels, dizziness, UTIs, fatigue, acne, and vomitting. Live vaccines should be avoided.  This medication has been linked to serious infections; higher rate of mortality; malignancy and lymphoproliferative disorders; major adverse cardiovascular events; thrombosis; thrombocytopenia and lymphopenia; lipid elevations; and retinal detachment. Benzoyl Peroxide Counseling: Patient counseled that medicine may cause skin irritation and bleach clothing.  In the event of skin irritation, the patient was advised to reduce the amount of the drug applied or use it less frequently.   The patient verbalized understanding of the proper use and possible adverse effects of benzoyl peroxide.  All of the patient's questions and concerns were addressed. Dapsone Counseling: I discussed with the patient the risks of dapsone including but not limited to hemolytic anemia, agranulocytosis, rashes, methemoglobinemia, kidney failure, peripheral neuropathy, headaches, GI upset, and liver toxicity.  Patients who start dapsone require monitoring including baseline LFTs and weekly CBCs for the first month, then every month thereafter.  The patient verbalized understanding of the proper use and possible adverse effects of dapsone.  All of the patient's questions and concerns were addressed. Topical Clindamycin Counseling: Patient counseled that this medication may cause skin irritation or allergic reactions.  In the event of skin irritation, the patient was advised to reduce the amount of the drug applied or use it less frequently.   The patient verbalized understanding of the proper use and possible adverse effects of clindamycin.  All of the patient's questions and concerns were addressed. 5-Fu Counseling: 5-Fluorouracil Counseling:  I discussed with the patient the risks of 5-fluorouracil including but not limited to erythema, scaling, itching, weeping, crusting, and pain. Zyclara Counseling:  I discussed with the patient the risks of imiquimod including but not limited to erythema, scaling, itching, weeping, crusting, and pain.  Patient understands that the inflammatory response to imiquimod is variable from person to person and was educated regarded proper titration schedule.  If flu-like symptoms develop, patient knows to discontinue the medication and contact us. Otezla Counseling: The side effects of Otezla were discussed with the patient, including but not limited to worsening or new depression, weight loss, diarrhea, nausea, upper respiratory tract infection, and headache. Patient instructed to call the office should any adverse effect occur.  The patient verbalized understanding of the proper use and possible adverse effects of Otezla.  All the patient's questions and concerns were addressed. Erythromycin Pregnancy And Lactation Text: This medication is Pregnancy Category B and is considered safe during pregnancy. It is also excreted in breast milk. Cyclophosphamide Counseling:  I discussed with the patient the risks of cyclophosphamide including but not limited to hair loss, hormonal abnormalities, decreased fertility, abdominal pain, diarrhea, nausea and vomiting, bone marrow suppression and infection. The patient understands that monitoring is required while taking this medication. Opzelura Counseling:  I discussed with the patient the risks of Opzelura including but not limited to nasopharngitis, bronchitis, ear infection, eosinophila, hives, diarrhea, folliculitis, tonsillitis, and rhinorrhea.  Taken orally, this medication has been linked to serious infections; higher rate of mortality; malignancy and lymphoproliferative disorders; major adverse cardiovascular events; thrombosis; thrombocytopenia, anemia, and neutropenia; and lipid elevations. Dupixent Pregnancy And Lactation Text: This medication likely crosses the placenta but the risk for the fetus is uncertain. This medication is excreted in breast milk. Wegovy Counseling: I reviewed the possible side effects including: thyroid tumors, kidney disease, gallbladder disease, abdominal pain, constipation, diarrhea, nausea, vomiting and pancreatitis. Do not take this medication if you have a history or family history of multiple endocrine neoplasia syndrome type 2. Side effects reviewed, pt to contact office should one occur. Birth Control Pills Counseling: Birth Control Pill Counseling: I discussed with the patient the potential side effects of OCPs including but not limited to increased risk of stroke, heart attack, thrombophlebitis, deep venous thrombosis, hepatic adenomas, breast changes, GI upset, headaches, and depression.  The patient verbalized understanding of the proper use and possible adverse effects of OCPs. All of the patient's questions and concerns were addressed. High Dose Vitamin A Counseling: Side effects reviewed, pt to contact office should one occur. Cibinqo Pregnancy And Lactation Text: It is unknown if this medication will adversely affect pregnancy or breast feeding.  You should not take this medication if you are currently pregnant or planning a pregnancy or while breastfeeding. Cyclophosphamide Pregnancy And Lactation Text: This medication is Pregnancy Category D and it isn't considered safe during pregnancy. This medication is excreted in breast milk. Benzoyl Peroxide Pregnancy And Lactation Text: This medication is Pregnancy Category C. It is unknown if benzoyl peroxide is excreted in breast milk. Doxepin Counseling:  Patient advised that the medication is sedating and not to drive a car after taking this medication. Patient informed of potential adverse effects including but not limited to dry mouth, urinary retention, and blurry vision.  The patient verbalized understanding of the proper use and possible adverse effects of doxepin.  All of the patient's questions and concerns were addressed. Dapsone Pregnancy And Lactation Text: This medication is Pregnancy Category C and is not considered safe during pregnancy or breast feeding. Simlandi Counseling:  I discussed with the patient the risks of adalimumab including but not limited to myelosuppression, immunosuppression, autoimmune hepatitis, demyelinating diseases, lymphoma, and serious infections.  The patient understands that monitoring is required including a PPD at baseline and must alert us or the primary physician if symptoms of infection or other concerning signs are noted. Sotyktu Counseling:  I discussed the most common side effects of Sotyktu including: common cold, sore throat, sinus infections, cold sores, canker sores, folliculitis, and acne.? I also discussed more serious side effects of Sotyktu including but not limited to: serious allergic reactions; increased risk for infections such as TB; cancers such as lymphomas; rhabdomyolysis and elevated CPK; and elevated triglycerides and liver enzymes.? Otezla Pregnancy And Lactation Text: This medication is Pregnancy Category C and it isn't known if it is safe during pregnancy. It is unknown if it is excreted in breast milk. Metronidazole Counseling:  I discussed with the patient the risks of metronidazole including but not limited to seizures, nausea/vomiting, a metallic taste in the mouth, nausea/vomiting and severe allergy. Opzelura Pregnancy And Lactation Text: There is insufficient data to evaluate drug-associated risk for major birth defects, miscarriage, or other adverse maternal or fetal outcomes.  There is a pregnancy registry that monitors pregnancy outcomes in pregnant persons exposed to the medication during pregnancy.  It is unknown if this medication is excreted in breast milk.  Do not breastfeed during treatment and for about 4 weeks after the last dose. Birth Control Pills Pregnancy And Lactation Text: This medication should be avoided if pregnant and for the first 30 days post-partum. Ebglyss Counseling: I discussed with the patient the risks of lebrikizumab including but not limited to eye inflammation and irritation, cold sores, injection site reactions, allergic reactions and increased risk of parasitic infection. The patient understands that monitoring is required and they must alert us or the primary physician if symptoms of infection or other concerning signs are noted. Adbry Pregnancy And Lactation Text: It is unknown if this medication will adversely affect pregnancy or breast feeding. Tetracycline Counseling: Patient counseled regarding possible photosensitivity and increased risk for sunburn.  Patient instructed to avoid sunlight, if possible.  When exposed to sunlight, patients should wear protective clothing, sunglasses, and sunscreen.  The patient was instructed to call the office immediately if the following severe adverse effects occur:  hearing changes, easy bruising/bleeding, severe headache, or vision changes.  The patient verbalized understanding of the proper use and possible adverse effects of tetracycline.  All of the patient's questions and concerns were addressed. Patient understands to avoid pregnancy while on therapy due to potential birth defects. Libtayo Pregnancy And Lactation Text: This medication is contraindicated in pregnancy and when breast feeding. Niacinamide Pregnancy And Lactation Text: These medications are considered safe during pregnancy. Solaraze Pregnancy And Lactation Text: This medication is Pregnancy Category B and is considered safe. There is some data to suggest avoiding during the third trimester. It is unknown if this medication is excreted in breast milk. Ketoconazole Pregnancy And Lactation Text: This medication is Pregnancy Category C and it isn't know if it is safe during pregnancy. It is also excreted in breast milk and breast feeding isn't recommended. Cephalexin Counseling: I counseled the patient regarding use of cephalexin as an antibiotic for prophylactic and/or therapeutic purposes. Cephalexin (commonly prescribed under brand name Keflex) is a cephalosporin antibiotic which is active against numerous classes of bacteria, including most skin bacteria. Side effects may include nausea, diarrhea, gastrointestinal upset, rash, hives, yeast infections, and in rare cases, hepatitis, kidney disease, seizures, fever, confusion, neurologic symptoms, and others. Patients with severe allergies to penicillin medications are cautioned that there is about a 10% incidence of cross-reactivity with cephalosporins. When possible, patients with penicillin allergies should use alternatives to cephalosporins for antibiotic therapy. Tranexamic Acid Pregnancy And Lactation Text: It is unknown if this medication is safe during pregnancy or breast feeding. Taltz Counseling: I discussed with the patient the risks of ixekizumab including but not limited to immunosuppression, serious infections, worsening of inflammatory bowel disease and drug reactions.  The patient understands that monitoring is required including a PPD at baseline and must alert us or the primary physician if symptoms of infection or other concerning signs are noted. Infliximab Counseling:  I discussed with the patient the risks of infliximab including but not limited to myelosuppression, immunosuppression, autoimmune hepatitis, demyelinating diseases, lymphoma, and serious infections.  The patient understands that monitoring is required including a PPD at baseline and must alert us or the primary physician if symptoms of infection or other concerning signs are noted. Bimzelx Counseling:  I discussed with the patient the risks of Bimzelx including but not limited to depression, immunosuppression, allergic reactions and infections.  The patient understands that monitoring is required including a PPD at baseline and must alert us or the primary physician if symptoms of infection or other concerning signs are noted. Detail Level: Simple Klisyri Counseling:  I discussed with the patient the risks of Klisyri including but not limited to erythema, scaling, itching, weeping, crusting, and pain. Valtrex Counseling: I discussed with the patient the risks of valacyclovir including but not limited to kidney damage, nausea, vomiting and severe allergy.  The patient understands that if the infection seems to be worsening or is not improving, they are to call. Opioid Counseling: I discussed with the patient the potential side effects of opioids including but not limited to addiction, altered mental status, and depression. I stressed avoiding alcohol, benzodiazepines, muscle relaxants and sleep aids unless specifically okayed by a physician. The patient verbalized understanding of the proper use and possible adverse effects of opioids. All of the patient's questions and concerns were addressed. They were instructed to flush the remaining pills down the toilet if they did not need them for pain. Nsaids Counseling: NSAID Counseling: I discussed with the patient that NSAIDs should be taken with food. Prolonged use of NSAIDs can result in the development of stomach ulcers.  Patient advised to stop taking NSAIDs if abdominal pain occurs.  The patient verbalized understanding of the proper use and possible adverse effects of NSAIDs.  All of the patient's questions and concerns were addressed. Odomzo Counseling- I discussed with the patient the risks of Odomzo including but not limited to nausea, vomiting, diarrhea, constipation, weight loss, changes in the sense of taste, decreased appetite, muscle spasms, and hair loss.  The patient verbalized understanding of the proper use and possible adverse effects of Odomzo.  All of the patient's questions and concerns were addressed. Winlevi Counseling:  I discussed with the patient the risks of topical clascoterone including but not limited to erythema, scaling, itching, and stinging. Patient voiced their understanding. Terbinafine Counseling: Patient counseling regarding adverse effects of terbinafine including but not limited to headache, diarrhea, rash, upset stomach, liver function test abnormalities, itching, taste/smell disturbance, nausea, abdominal pain, and flatulence.  There is a rare possibility of liver failure that can occur when taking terbinafine.  The patient understands that a baseline LFT and kidney function test may be required. The patient verbalized understanding of the proper use and possible adverse effects of terbinafine.  All of the patient's questions and concerns were addressed. Ozempic Counseling: I reviewed the possible side effects including: thyroid tumors, kidney disease, gallbladder disease, abdominal pain, constipation, diarrhea, nausea, vomiting and pancreatitis. Do not take this medication if you have a history or family history of multiple endocrine neoplasia syndrome type 2. Side effects reviewed, pt to contact office should one occur. Cephalexin Pregnancy And Lactation Text: This medication is Pregnancy Category B and considered safe during pregnancy.  It is also excreted in breast milk but can be used safely for shorter doses. Soolantra Counseling: I discussed with the patients the risks of topial Soolantra. This is a medicine which decreases the number of mites and inflammation in the skin. You experience burning, stinging, eye irritation or allergic reactions.  Please call our office if you develop any problems from using this medication. Dutasteride Male Counseling: Dustasteride Counseling:  I discussed with the patient the risks of use of dutasteride including but not limited to decreased libido, decreased ejaculate volume, and gynecomastia. Women who can become pregnant should not handle medication.  All of the patient's questions and concerns were addressed. Acitretin Counseling:  I discussed with the patient the risks of acitretin including but not limited to hair loss, dry lips/skin/eyes, liver damage, hyperlipidemia, depression/suicidal ideation, photosensitivity.  Serious rare side effects can include but are not limited to pancreatitis, pseudotumor cerebri, bony changes, clot formation/stroke/heart attack.  Patient understands that alcohol is contraindicated since it can result in liver toxicity and significantly prolong the elimination of the drug by many years. Tremfya Counseling: I discussed with the patient the risks of guselkumab including but not limited to immunosuppression, serious infections, worsening of inflammatory bowel disease and drug reactions.  The patient understands that monitoring is required including a PPD at baseline and must alert us or the primary physician if symptoms of infection or other concerning signs are noted. Opioid Pregnancy And Lactation Text: These medications can lead to premature delivery and should be avoided during pregnancy. These medications are also present in breast milk in small amounts. Bimzelx Pregnancy And Lactation Text: This medication crosses the placenta and the safety is uncertain during pregnancy. It is unknown if this medication is present in breast milk. Include Pregnancy/Lactation Warning?: No Odomzo Pregnancy And Lactation Text: This medication is Pregnancy Category X and is absolutely contraindicated during pregnancy. It is unknown if it is excreted in breast milk. Winlevi Pregnancy And Lactation Text: This medication is considered safe during pregnancy and breastfeeding. Nsaids Pregnancy And Lactation Text: These medications are considered safe up to 30 weeks gestation. It is excreted in breast milk. Clindamycin Counseling: I counseled the patient regarding use of clindamycin as an antibiotic for prophylactic and/or therapeutic purposes. Clindamycin is active against numerous classes of bacteria, including skin bacteria. Side effects may include nausea, diarrhea, gastrointestinal upset, rash, hives, yeast infections, and in rare cases, colitis. Valtrex Pregnancy And Lactation Text: this medication is Pregnancy Category B and is considered safe during pregnancy. This medication is not directly found in breast milk but it's metabolite acyclovir is present. Nemluvio Counseling: I discussed with the patient the risks of nemolizumab including but not limited to headache, gastrointestinal complaints, nasopharyngitis, musculoskeletal complaints, injection site reactions, and allergic reactions. The patient understands that monitoring is required and they must alert us or the primary physician if any side effects are noted. Klisyri Pregnancy And Lactation Text: It is unknown if this medication can harm a developing fetus or if it is excreted in breast milk. Soolantra Pregnancy And Lactation Text: This medication is Pregnancy Category C. This medication is considered safe during breast feeding. Dutasteride Female Counseling: Dutasteride Counseling:  I discussed with the patient the risks of use of dutasteride including but not limited to decreased libido and sexual dysfunction. Explained the teratogenic nature of the medication and stressed the importance of not getting pregnant during treatment. All of the patient's questions and concerns were addressed. Aklief counseling:  Patient advised to apply a pea-sized amount only at bedtime and wait 30 minutes after washing their face before applying.  If too drying, patient may add a non-comedogenic moisturizer.  The most commonly reported side effects including irritation, redness, scaling, dryness, stinging, burning, itching, and increased risk of sunburn.  The patient verbalized understanding of the proper use and possible adverse effects of retinoids.  All of the patient's questions and concerns were addressed. Cimzia Counseling:  I discussed with the patient the risks of Cimzia including but not limited to immunosuppression, allergic reactions and infections.  The patient understands that monitoring is required including a PPD at baseline and must alert us or the primary physician if symptoms of infection or other concerning signs are noted. Acitretin Pregnancy And Lactation Text: This medication is Pregnancy Category X and should not be given to women who are pregnant or may become pregnant in the future. This medication is excreted in breast milk. Azathioprine Counseling:  I discussed with the patient the risks of azathioprine including but not limited to myelosuppression, immunosuppression, hepatotoxicity, lymphoma, and infections.  The patient understands that monitoring is required including baseline LFTs, Creatinine, possible TPMP genotyping and weekly CBCs for the first month and then every 2 weeks thereafter.  The patient verbalized understanding of the proper use and possible adverse effects of azathioprine.  All of the patient's questions and concerns were addressed. Topical Retinoid counseling:  Patient advised to apply a pea-sized amount only at bedtime and wait 30 minutes after washing their face before applying.  If too drying, patient may add a non-comedogenic moisturizer. The patient verbalized understanding of the proper use and possible adverse effects of retinoids.  All of the patient's questions and concerns were addressed. Albendazole Counseling:  I discussed with the patient the risks of albendazole including but not limited to cytopenia, kidney damage, nausea/vomiting and severe allergy.  The patient understands that this medication is being used in an off-label manner. VTAMA Counseling: I discussed with the patient that VTAMA is not for use in the eyes, mouth or mouth. They should call the office if they develop any signs of allergic reactions to VTAMA. The patient verbalized understanding of the proper use and possible adverse effects of VTAMA.  All of the patient's questions and concerns were addressed. Olanzapine Counseling- I discussed with the patient the common side effects of olanzapine including but are not limited to: lack of energy, dry mouth, increased appetite, sleepiness, tremor, constipation, dizziness, changes in behavior, or restlessness.  Explained that teenagers are more likely to experience headaches, abdominal pain, pain in the arms or legs, tiredness, and sleepiness.  Serious side effects include but are not limited: increased risk of death in elderly patients who are confused, have memory loss, or dementia-related psychosis; hyperglycemia; increased cholesterol and triglycerides; and weight gain. Clindamycin Pregnancy And Lactation Text: This medication can be used in pregnancy if certain situations. Clindamycin is also present in breast milk. Minoxidil Counseling: Minoxidil is a topical medication which can increase blood flow where it is applied. It is uncertain how this medication increases hair growth. Side effects are uncommon and include stinging and allergic reactions. Saxenda Counseling: I reviewed the possible side effects including: thyroid tumors, kidney disease, gallbladder disease, abdominal pain, constipation, diarrhea, nausea, vomiting and pancreatitis. Do not take this medication if you have a history or family history of multiple endocrine neoplasia syndrome type 2. Side effects reviewed, pt to contact office should one occur. Dutasteride Pregnancy And Lactation Text: This medication is absolutely contraindicated in women, especially during pregnancy and breast feeding. Feminization of male fetuses is possible if taking while pregnant. Cimzia Pregnancy And Lactation Text: This medication crosses the placenta but can be considered safe in certain situations. Cimzia may be excreted in breast milk. Nemluvio Pregnancy And Lactation Text: It is not known if Nemluvio causes fetal harm or is present in breast milk. Please proceed with caution if patients who are pregnant or breastfeeding. Xolair Counseling:  Patient informed of potential adverse effects including but not limited to fever, muscle aches, rash and allergic reactions.  The patient verbalized understanding of the proper use and possible adverse effects of Xolair.  All of the patient's questions and concerns were addressed. Bexarotene Counseling:  I discussed with the patient the risks of bexarotene including but not limited to hair loss, dry lips/skin/eyes, liver abnormalities, hyperlipidemia, pancreatitis, depression/suicidal ideation, photosensitivity, drug rash/allergic reactions, hypothyroidism, anemia, leukopenia, infection, cataracts, and teratogenicity.  Patient understands that they will need regular blood tests to check lipid profile, liver function tests, white blood cell count, thyroid function tests and pregnancy test if applicable. Aklief Pregnancy And Lactation Text: It is unknown if this medication is safe to use during pregnancy.  It is unknown if this medication is excreted in breast milk.  Breastfeeding women should use the topical cream on the smallest area of the skin for the shortest time needed while breastfeeding.  Do not apply to nipple and areola. Olanzapine Pregnancy And Lactation Text: This medication is pregnancy category C.   There are no adequate and well controlled trials with olanzapine in pregnant females.  Olanzapine should be used during pregnancy only if the potential benefit justifies the potential risk to the fetus.   In a study in lactating healthy women, olanzapine was excreted in breast milk.  It is recommended that women taking olanzapine should not breast feed. Minoxidil Pregnancy And Lactation Text: This medication has not been assigned a Pregnancy Risk Category but animal studies failed to show danger with the topical medication. It is unknown if the medication is excreted in breast milk. Prednisone Counseling:  I discussed with the patient the risks of prolonged use of prednisone including but not limited to weight gain, insomnia, osteoporosis, mood changes, diabetes, susceptibility to infection, glaucoma and high blood pressure.  In cases where prednisone use is prolonged, patients should be monitored with blood pressure checks, serum glucose levels and an eye exam.  Additionally, the patient may need to be placed on GI prophylaxis, PCP prophylaxis, and calcium and vitamin D supplementation and/or a bisphosphonate.  The patient verbalized understanding of the proper use and the possible adverse effects of prednisone.  All of the patient's questions and concerns were addressed. Topical Steroids Counseling: I discussed with the patient that prolonged use of topical steroids can result in the increased appearance of superficial blood vessels (telangiectasias), lightening (hypopigmentation) and thinning of the skin (atrophy).  Patient understands to avoid using high potency steroids in skin folds, the groin or the face.  The patient verbalized understanding of the proper use and possible adverse effects of topical steroids.  All of the patient's questions and concerns were addressed. Griseofulvin Counseling:  I discussed with the patient the risks of griseofulvin including but not limited to photosensitivity, cytopenia, liver damage, nausea/vomiting and severe allergy.  The patient understands that this medication is best absorbed when taken with a fatty meal (e.g., ice cream or french fries). Rinvoq Pregnancy And Lactation Text: Based on animal studies, Rinvoq may cause embryo-fetal harm when administered to pregnant women.  The medication should not be used in pregnancy.  Breastfeeding is not recommended during treatment and for 6 days after the last dose. Rifampin Counseling: I discussed with the patient the risks of rifampin including but not limited to liver damage, kidney damage, red-orange body fluids, nausea/vomiting and severe allergy. Hydroxychloroquine Pregnancy And Lactation Text: This medication has been shown to cause fetal harm but it isn't assigned a Pregnancy Risk Category. There are small amounts excreted in breast milk. Griseofulvin Pregnancy And Lactation Text: This medication is Pregnancy Category X and is known to cause serious birth defects. It is unknown if this medication is excreted in breast milk but breast feeding should be avoided. Qbrexza Pregnancy And Lactation Text: There is no available data on Qbrexza use in pregnant women.  There is no available data on Qbrexza use in lactation. Clofazimine Counseling:  I discussed with the patient the risks of clofazimine including but not limited to skin and eye pigmentation, liver damage, nausea/vomiting, gastrointestinal bleeding and allergy. Sski Pregnancy And Lactation Text: This medication is Pregnancy Category D and isn't considered safe during pregnancy. It is excreted in breast milk. Spevigo Counseling: I discussed with the patient the risks of Spevigo including but not limited to fatigue, nasuea, vomiting, headache, pruritus, urinary tract infection, an infusion related reactions.  The patient understands that monitoring is required including screening for tuberculosis at baseline and yearly screening thereafter while continuing Spevigo therapy. They should contact us if symptoms of infection or other concerning signs are noted. Hyrimoz Counseling:  I discussed with the patient the risks of adalimumab including but not limited to myelosuppression, immunosuppression, autoimmune hepatitis, demyelinating diseases, lymphoma, and serious infections.  The patient understands that monitoring is required including a PPD at baseline and must alert us or the primary physician if symptoms of infection or other concerning signs are noted. Topical Steroids Applications Pregnancy And Lactation Text: Most topical steroids are considered safe to use during pregnancy and lactation.  Any topical steroid applied to the breast or nipple should be washed off before breastfeeding. Azithromycin Counseling:  I discussed with the patient the risks of azithromycin including but not limited to GI upset, allergic reaction, drug rash, diarrhea, and yeast infections. Spevigo Pregnancy And Lactation Text: The risk during pregnancy and breastfeeding is uncertain with this medication. This medication does cross the placenta. It is unknown if this medication is found in breast milk. Rifampin Pregnancy And Lactation Text: This medication is Pregnancy Category C and it isn't know if it is safe during pregnancy. It is also excreted in breast milk and should not be used if you are breast feeding. Xeljanz Counseling: I discussed with the patient the risks of Xeljanz therapy including increased risk of infection, liver issues, headache, diarrhea, or cold symptoms. Live vaccines should be avoided. They were instructed to call if they have any problems. Hydroquinone Counseling:  Patient advised that medication may result in skin irritation, lightening (hypopigmentation), dryness, and burning.  In the event of skin irritation, the patient was advised to reduce the amount of the drug applied or use it less frequently.  Rarely, spots that are treated with hydroquinone can become darker (pseudoochronosis).  Should this occur, patient instructed to stop medication and call the office. The patient verbalized understanding of the proper use and possible adverse effects of hydroquinone.  All of the patient's questions and concerns were addressed. Thalidomide Counseling: I discussed with the patient the risks of thalidomide including but not limited to birth defects, anxiety, weakness, chest pain, dizziness, cough and severe allergy. Low Dose Naltrexone Counseling- I discussed with the patient the potential risks and side effects of low dose naltrexone including but not limited to: more vivid dreams, headaches, nausea, vomiting, abdominal pain, fatigue, dizziness, and anxiety. Erivedge Counseling- I discussed with the patient the risks of Erivedge including but not limited to nausea, vomiting, diarrhea, constipation, weight loss, changes in the sense of taste, decreased appetite, muscle spasms, and hair loss.  The patient verbalized understanding of the proper use and possible adverse effects of Erivedge.  All of the patient's questions and concerns were addressed. Topical Sulfur Applications Counseling: Topical Sulfur Counseling: Patient counseled that this medication may cause skin irritation or allergic reactions.  In the event of skin irritation, the patient was advised to reduce the amount of the drug applied or use it less frequently.   The patient verbalized understanding of the proper use and possible adverse effects of topical sulfur application.  All of the patient's questions and concerns were addressed. Itraconazole Counseling:  I discussed with the patient the risks of itraconazole including but not limited to liver damage, nausea/vomiting, neuropathy, and severe allergy.  The patient understands that this medication is best absorbed when taken with acidic beverages such as non-diet cola or ginger ale.  The patient understands that monitoring is required including baseline LFTs and repeat LFTs at intervals.  The patient understands that they are to contact us or the primary physician if concerning signs are noted. Rhofade Counseling: Rhofade is a topical medication which can decrease superficial blood flow where applied. Side effects are uncommon and include stinging, redness and allergic reactions. Azithromycin Pregnancy And Lactation Text: This medication is considered safe during pregnancy and is also secreted in breast milk. Cantharidin Pregnancy And Lactation Text: This medication has not been proven safe during pregnancy. It is unknown if this medication is excreted in breast milk. Xelfraciscoz Pregnancy And Lactation Text: This medication is Pregnancy Category D and is not considered safe during pregnancy.  The risk during breast feeding is also uncertain. Stelara Counseling:  I discussed with the patient the risks of ustekinumab including but not limited to immunosuppression, malignancy, posterior leukoencephalopathy syndrome, and serious infections.  The patient understands that monitoring is required including a PPD at baseline and must alert us or the primary physician if symptoms of infection or other concerning signs are noted. Low Dose Naltrexone Pregnancy And Lactation Text: Naltrexone is pregnancy category C.  There have been no adequate and well-controlled studies in pregnant women.  It should be used in pregnancy only if the potential benefit justifies the potential risk to the fetus.   Limited data indicates that naltrexone is minimally excreted into breastmilk. Topical Sulfur Applications Pregnancy And Lactation Text: This medication is Pregnancy Category C and has an unknown safety profile during pregnancy. It is unknown if this topical medication is excreted in breast milk. Sarecycline Counseling: Patient advised regarding possible photosensitivity and discoloration of the teeth, skin, lips, tongue and gums.  Patient instructed to avoid sunlight, if possible.  When exposed to sunlight, patients should wear protective clothing, sunglasses, and sunscreen.  The patient was instructed to call the office immediately if the following severe adverse effects occur:  hearing changes, easy bruising/bleeding, severe headache, or vision changes.  The patient verbalized understanding of the proper use and possible adverse effects of sarecycline.  All of the patient's questions and concerns were addressed. Bactrim Counseling:  I discussed with the patient the risks of sulfa antibiotics including but not limited to GI upset, allergic reaction, drug rash, diarrhea, dizziness, photosensitivity, and yeast infections.  Rarely, more serious reactions can occur including but not limited to aplastic anemia, agranulocytosis, methemoglobinemia, blood dyscrasias, liver or kidney failure, lung infiltrates or desquamative/blistering drug rashes. Ilumya Counseling: I discussed with the patient the risks of tildrakizumab including but not limited to immunosuppression, malignancy, posterior leukoencephalopathy syndrome, and serious infections.  The patient understands that monitoring is required including a PPD at baseline and must alert us or the primary physician if symptoms of infection or other concerning signs are noted. Adbry Counseling: I discussed with the patient the risks of tralokinumab including but not limited to eye infection and irritation, cold sores, injection site reactions, worsening of asthma, allergic reactions and increased risk of parasitic infection.  Live vaccines should be avoided while taking tralokinumab. The patient understands that monitoring is required and they must alert us or the primary physician if symptoms of infection or other concerning signs are noted. Tranexamic Acid Counseling:  Patient advised of the small risk of bleeding problems with tranexamic acid. They were also instructed to call if they developed any nausea, vomiting or diarrhea. All of the patient's questions and concerns were addressed. Solaraze Counseling:  I discussed with the patient the risks of Solaraze including but not limited to erythema, scaling, itching, weeping, crusting, and pain. Niacinamide Counseling: I recommended taking niacin or niacinamide, also know as vitamin B3, twice daily. Recent evidence suggests that taking vitamin B3 (500 mg twice daily) can reduce the risk of actinic keratoses and non-melanoma skin cancers. Side effects of vitamin B3 include flushing and headache. Wartpeel Counseling:  I discussed with the patient the risks of Wartpeel including but not limited to erythema, scaling, itching, weeping, crusting, and pain. Imiquimod Counseling:  I discussed with the patient the risks of imiquimod including but not limited to erythema, scaling, itching, weeping, crusting, and pain.  Patient understands that the inflammatory response to imiquimod is variable from person to person and was educated regarded proper titration schedule.  If flu-like symptoms develop, patient knows to discontinue the medication and contact us. Bactrim Pregnancy And Lactation Text: This medication is Pregnancy Category D and is known to cause fetal risk.  It is also excreted in breast milk. Ketoconazole Counseling:   Patient counseled regarding improving absorption with orange juice.  Adverse effects include but are not limited to breast enlargement, headache, diarrhea, nausea, upset stomach, liver function test abnormalities, taste disturbance, and stomach pain.  There is a rare possibility of liver failure that can occur when taking ketoconazole. The patient understands that monitoring of LFTs may be required, especially at baseline. The patient verbalized understanding of the proper use and possible adverse effects of ketoconazole.  All of the patient's questions and concerns were addressed. Libtayo Counseling- I discussed with the patient the risks of Libtayo including but not limited to nausea, vomiting, diarrhea, and bone or muscle pain.  The patient verbalized understanding of the proper use and possible adverse effects of Libtayo.  All of the patient's questions and concerns were addressed.

## 2025-03-05 ENCOUNTER — NON-APPOINTMENT (OUTPATIENT)
Age: 55
End: 2025-03-05

## 2025-03-08 ENCOUNTER — NON-APPOINTMENT (OUTPATIENT)
Age: 55
End: 2025-03-08

## 2025-03-10 ENCOUNTER — NON-APPOINTMENT (OUTPATIENT)
Age: 55
End: 2025-03-10

## 2025-03-10 ENCOUNTER — APPOINTMENT (OUTPATIENT)
Dept: CARDIOLOGY | Facility: CLINIC | Age: 55
End: 2025-03-10
Payer: MEDICARE

## 2025-03-10 VITALS
WEIGHT: 288 LBS | BODY MASS INDEX: 41.23 KG/M2 | SYSTOLIC BLOOD PRESSURE: 100 MMHG | HEIGHT: 70 IN | DIASTOLIC BLOOD PRESSURE: 70 MMHG | OXYGEN SATURATION: 99 % | HEART RATE: 75 BPM

## 2025-03-10 DIAGNOSIS — E78.5 HYPERLIPIDEMIA, UNSPECIFIED: ICD-10-CM

## 2025-03-10 DIAGNOSIS — Z95.810 PRESENCE OF AUTOMATIC (IMPLANTABLE) CARDIAC DEFIBRILLATOR: ICD-10-CM

## 2025-03-10 DIAGNOSIS — E66.01 MORBID (SEVERE) OBESITY DUE TO EXCESS CALORIES: ICD-10-CM

## 2025-03-10 DIAGNOSIS — I34.0 NONRHEUMATIC MITRAL (VALVE) INSUFFICIENCY: ICD-10-CM

## 2025-03-10 DIAGNOSIS — I42.0 DILATED CARDIOMYOPATHY: ICD-10-CM

## 2025-03-10 DIAGNOSIS — I10 ESSENTIAL (PRIMARY) HYPERTENSION: ICD-10-CM

## 2025-03-10 DIAGNOSIS — I50.22 CHRONIC SYSTOLIC (CONGESTIVE) HEART FAILURE: ICD-10-CM

## 2025-03-10 PROCEDURE — G2211 COMPLEX E/M VISIT ADD ON: CPT

## 2025-03-10 PROCEDURE — 93282 PRGRMG EVAL IMPLANTABLE DFB: CPT

## 2025-03-10 PROCEDURE — 99214 OFFICE O/P EST MOD 30 MIN: CPT

## 2025-03-10 PROCEDURE — 93000 ELECTROCARDIOGRAM COMPLETE: CPT | Mod: XU

## 2025-03-10 PROCEDURE — 99214 OFFICE O/P EST MOD 30 MIN: CPT | Mod: 25

## 2025-04-07 ENCOUNTER — NON-APPOINTMENT (OUTPATIENT)
Age: 55
End: 2025-04-07

## 2025-05-06 ENCOUNTER — OFFICE (OUTPATIENT)
Dept: URBAN - METROPOLITAN AREA CLINIC 105 | Facility: CLINIC | Age: 55
Setting detail: OPHTHALMOLOGY
End: 2025-05-06
Payer: COMMERCIAL

## 2025-05-06 DIAGNOSIS — Z01.00: ICD-10-CM

## 2025-05-06 PROCEDURE — 92014 COMPRE OPH EXAM EST PT 1/>: CPT | Performed by: OPTOMETRIST

## 2025-05-06 ASSESSMENT — REFRACTION_MANIFEST
OS_CYLINDER: -2.25
OS_SPHERE: +1.75
OS_VA1: 20/60+
OD_SPHERE: +1.75
OD_VA1: 20/400
OD_SPHERE: BALANCE
OD_ADD: +2.75
OS_ADD: +2.75
OD_CYLINDER: -3.00
OD_AXIS: 150
OS_AXIS: 150

## 2025-05-06 ASSESSMENT — REFRACTION_CURRENTRX
OD_VPRISM_DIRECTION: SV
OD_CYLINDER: -2.00
OD_AXIS: 014
OS_SPHERE: +1.75
OS_AXIS: 150
OD_AXIS: 048
OD_VPRISM_DIRECTION: SV
OS_AXIS: 157
OD_CYLINDER: -4.25
OD_SPHERE: +0.50
OD_OVR_VA: 20/
OS_OVR_VA: 20/
OD_SPHERE: -18.50
OS_CYLINDER: -2.50
OS_VPRISM_DIRECTION: SV
OS_SPHERE: -17.75
OS_CYLINDER: -5.00
OD_OVR_VA: 20/
OS_VPRISM_DIRECTION: SV
OS_OVR_VA: 20/

## 2025-05-06 ASSESSMENT — REFRACTION_AUTOREFRACTION
OD_AXIS: 034
OD_CYLINDER: -2.00
OD_SPHERE: -0.50
OS_CYLINDER: -2.50
OS_SPHERE: +1.50
OS_AXIS: 151

## 2025-05-06 ASSESSMENT — TONOMETRY
OS_IOP_MMHG: 18
OD_IOP_MMHG: 18

## 2025-05-06 ASSESSMENT — KERATOMETRY
METHOD_AUTO_MANUAL: AUTO
OS_AXISANGLE_DEGREES: 065
OD_K1POWER_DIOPTERS: 43.00
OD_AXISANGLE_DEGREES: 116
OD_K2POWER_DIOPTERS: 45.25
OS_K2POWER_DIOPTERS: 45.25
OS_K1POWER_DIOPTERS: 42.50

## 2025-05-06 ASSESSMENT — CONFRONTATIONAL VISUAL FIELD TEST (CVF)
OS_FINDINGS: FULL
OD_FINDINGS: FULL

## 2025-05-06 ASSESSMENT — VISUAL ACUITY
OS_BCVA: 20/400
OD_BCVA: 20/50

## 2025-05-07 ENCOUNTER — RX RENEWAL (OUTPATIENT)
Age: 55
End: 2025-05-07

## 2025-06-02 ENCOUNTER — APPOINTMENT (OUTPATIENT)
Dept: FAMILY MEDICINE | Facility: CLINIC | Age: 55
End: 2025-06-02
Payer: MEDICARE

## 2025-06-02 VITALS
HEIGHT: 70 IN | HEART RATE: 78 BPM | WEIGHT: 298.38 LBS | OXYGEN SATURATION: 98 % | DIASTOLIC BLOOD PRESSURE: 80 MMHG | TEMPERATURE: 98.3 F | RESPIRATION RATE: 16 BRPM | SYSTOLIC BLOOD PRESSURE: 120 MMHG | BODY MASS INDEX: 42.72 KG/M2

## 2025-06-02 DIAGNOSIS — I10 ESSENTIAL (PRIMARY) HYPERTENSION: ICD-10-CM

## 2025-06-02 DIAGNOSIS — Z12.5 ENCOUNTER FOR SCREENING FOR MALIGNANT NEOPLASM OF PROSTATE: ICD-10-CM

## 2025-06-02 DIAGNOSIS — Z12.12 ENCOUNTER FOR SCREENING FOR MALIGNANT NEOPLASM OF COLON: ICD-10-CM

## 2025-06-02 DIAGNOSIS — D72.829 ELEVATED WHITE BLOOD CELL COUNT, UNSPECIFIED: ICD-10-CM

## 2025-06-02 DIAGNOSIS — I50.22 CHRONIC SYSTOLIC (CONGESTIVE) HEART FAILURE: ICD-10-CM

## 2025-06-02 DIAGNOSIS — Z00.00 ENCOUNTER FOR GENERAL ADULT MEDICAL EXAMINATION W/OUT ABNORMAL FINDINGS: ICD-10-CM

## 2025-06-02 DIAGNOSIS — Z13.21 ENCOUNTER FOR SCREENING FOR OTHER SUSPECTED ENDOCRINE DISORDER: ICD-10-CM

## 2025-06-02 DIAGNOSIS — Z12.11 ENCOUNTER FOR SCREENING FOR MALIGNANT NEOPLASM OF COLON: ICD-10-CM

## 2025-06-02 DIAGNOSIS — Z13.29 ENCOUNTER FOR SCREENING FOR OTHER SUSPECTED ENDOCRINE DISORDER: ICD-10-CM

## 2025-06-02 DIAGNOSIS — E04.9 NONTOXIC GOITER, UNSPECIFIED: ICD-10-CM

## 2025-06-02 DIAGNOSIS — I47.29 OTHER VENTRICULAR TACHYCARDIA: ICD-10-CM

## 2025-06-02 DIAGNOSIS — Z13.0 ENCOUNTER FOR SCREENING FOR OTHER SUSPECTED ENDOCRINE DISORDER: ICD-10-CM

## 2025-06-02 DIAGNOSIS — I42.0 DILATED CARDIOMYOPATHY: ICD-10-CM

## 2025-06-02 DIAGNOSIS — Z95.810 PRESENCE OF AUTOMATIC (IMPLANTABLE) CARDIAC DEFIBRILLATOR: ICD-10-CM

## 2025-06-02 DIAGNOSIS — Z13.228 ENCOUNTER FOR SCREENING FOR OTHER SUSPECTED ENDOCRINE DISORDER: ICD-10-CM

## 2025-06-02 DIAGNOSIS — E66.01 MORBID (SEVERE) OBESITY DUE TO EXCESS CALORIES: ICD-10-CM

## 2025-06-02 LAB
BILIRUB UR QL STRIP: NEGATIVE
CLARITY UR: CLEAR
COLLECTION METHOD: NORMAL
GLUCOSE UR-MCNC: ABNORMAL
HCG UR QL: 0.2 EU/DL
HGB UR QL STRIP.AUTO: NEGATIVE
KETONES UR-MCNC: NEGATIVE
LEUKOCYTE ESTERASE UR QL STRIP: NEGATIVE
NITRITE UR QL STRIP: NEGATIVE
PH UR STRIP: 5.5
PROT UR STRIP-MCNC: NEGATIVE
SP GR UR STRIP: 1.01

## 2025-06-02 PROCEDURE — G0439: CPT

## 2025-06-02 PROCEDURE — 36415 COLL VENOUS BLD VENIPUNCTURE: CPT

## 2025-06-02 PROCEDURE — 81003 URINALYSIS AUTO W/O SCOPE: CPT | Mod: QW

## 2025-06-09 ENCOUNTER — LABORATORY RESULT (OUTPATIENT)
Age: 55
End: 2025-06-09

## 2025-06-10 LAB
ALBUMIN SERPL ELPH-MCNC: 4.4 G/DL
ALP BLD-CCNC: 71 U/L
ALT SERPL-CCNC: 27 U/L
AMYLASE/CREAT SERPL: 91 U/L
ANION GAP SERPL CALC-SCNC: 13 MMOL/L
AST SERPL-CCNC: 26 U/L
BASOPHILS # BLD AUTO: 0.05 K/UL
BASOPHILS NFR BLD AUTO: 0.5 %
BILIRUB SERPL-MCNC: 0.2 MG/DL
BUN SERPL-MCNC: 8 MG/DL
CALCIUM SERPL-MCNC: 9.7 MG/DL
CHLORIDE SERPL-SCNC: 106 MMOL/L
CHOLEST SERPL-MCNC: 132 MG/DL
CO2 SERPL-SCNC: 21 MMOL/L
CREAT SERPL-MCNC: 0.95 MG/DL
EGFRCR SERPLBLD CKD-EPI 2021: 94 ML/MIN/1.73M2
EOSINOPHIL # BLD AUTO: 0.17 K/UL
EOSINOPHIL NFR BLD AUTO: 1.8 %
ESTIMATED AVERAGE GLUCOSE: 126 MG/DL
GLUCOSE SERPL-MCNC: 101 MG/DL
HBA1C MFR BLD HPLC: 6 %
HCT VFR BLD CALC: 40.9 %
HDLC SERPL-MCNC: 29 MG/DL
HGB BLD-MCNC: 13.1 G/DL
IMM GRANULOCYTES NFR BLD AUTO: 0.3 %
LDLC SERPL-MCNC: 81 MG/DL
LPL SERPL-CCNC: 23 U/L
LYMPHOCYTES # BLD AUTO: 2.14 K/UL
LYMPHOCYTES NFR BLD AUTO: 22.9 %
MAN DIFF?: NORMAL
MCHC RBC-ENTMCNC: 27.8 PG
MCHC RBC-ENTMCNC: 32 G/DL
MCV RBC AUTO: 86.7 FL
MONOCYTES # BLD AUTO: 0.63 K/UL
MONOCYTES NFR BLD AUTO: 6.7 %
NEUTROPHILS # BLD AUTO: 6.33 K/UL
NEUTROPHILS NFR BLD AUTO: 67.8 %
NONHDLC SERPL-MCNC: 103 MG/DL
PLATELET # BLD AUTO: 270 K/UL
POTASSIUM SERPL-SCNC: 4.5 MMOL/L
PROT SERPL-MCNC: 7.2 G/DL
PSA SERPL-MCNC: 0.35 NG/ML
RBC # BLD: 4.72 M/UL
RBC # FLD: 13.2 %
SODIUM SERPL-SCNC: 140 MMOL/L
TRIGL SERPL-MCNC: 116 MG/DL
TSH SERPL-ACNC: 1.12 UIU/ML
WBC # FLD AUTO: 9.35 K/UL

## 2025-08-18 ENCOUNTER — RX RENEWAL (OUTPATIENT)
Age: 55
End: 2025-08-18

## 2025-09-10 ENCOUNTER — APPOINTMENT (OUTPATIENT)
Dept: CARDIOLOGY | Facility: CLINIC | Age: 55
End: 2025-09-10
Payer: MEDICARE

## 2025-09-10 VITALS
OXYGEN SATURATION: 98 % | SYSTOLIC BLOOD PRESSURE: 90 MMHG | HEART RATE: 71 BPM | HEIGHT: 70 IN | WEIGHT: 261 LBS | BODY MASS INDEX: 37.37 KG/M2 | DIASTOLIC BLOOD PRESSURE: 52 MMHG

## 2025-09-10 DIAGNOSIS — I42.0 DILATED CARDIOMYOPATHY: ICD-10-CM

## 2025-09-10 DIAGNOSIS — I50.22 CHRONIC SYSTOLIC (CONGESTIVE) HEART FAILURE: ICD-10-CM

## 2025-09-10 DIAGNOSIS — E78.5 HYPERLIPIDEMIA, UNSPECIFIED: ICD-10-CM

## 2025-09-10 DIAGNOSIS — I10 ESSENTIAL (PRIMARY) HYPERTENSION: ICD-10-CM

## 2025-09-10 DIAGNOSIS — E66.01 MORBID (SEVERE) OBESITY DUE TO EXCESS CALORIES: ICD-10-CM

## 2025-09-10 DIAGNOSIS — Z95.810 PRESENCE OF AUTOMATIC (IMPLANTABLE) CARDIAC DEFIBRILLATOR: ICD-10-CM

## 2025-09-10 PROCEDURE — 93282 PRGRMG EVAL IMPLANTABLE DFB: CPT

## 2025-09-10 PROCEDURE — 99213 OFFICE O/P EST LOW 20 MIN: CPT

## 2025-09-10 PROCEDURE — G2211 COMPLEX E/M VISIT ADD ON: CPT
